# Patient Record
Sex: MALE | Race: WHITE | NOT HISPANIC OR LATINO | Employment: FULL TIME | URBAN - NONMETROPOLITAN AREA
[De-identification: names, ages, dates, MRNs, and addresses within clinical notes are randomized per-mention and may not be internally consistent; named-entity substitution may affect disease eponyms.]

---

## 2022-06-28 ENCOUNTER — PATIENT MESSAGE (OUTPATIENT)
Dept: FAMILY MEDICINE CLINIC | Age: 32
End: 2022-06-28

## 2022-06-28 ENCOUNTER — OFFICE VISIT (OUTPATIENT)
Dept: FAMILY MEDICINE CLINIC | Age: 32
End: 2022-06-28

## 2022-06-28 ENCOUNTER — LAB (OUTPATIENT)
Dept: LAB | Facility: HOSPITAL | Age: 32
End: 2022-06-28

## 2022-06-28 ENCOUNTER — HOSPITAL ENCOUNTER (OUTPATIENT)
Dept: GENERAL RADIOLOGY | Facility: HOSPITAL | Age: 32
Discharge: HOME | End: 2022-06-28

## 2022-06-28 VITALS
DIASTOLIC BLOOD PRESSURE: 85 MMHG | WEIGHT: 234.6 LBS | BODY MASS INDEX: 34.75 KG/M2 | SYSTOLIC BLOOD PRESSURE: 120 MMHG | TEMPERATURE: 98.6 F | HEART RATE: 90 BPM | HEIGHT: 69 IN

## 2022-06-28 DIAGNOSIS — G89.29 CHRONIC LEFT SHOULDER PAIN: ICD-10-CM

## 2022-06-28 DIAGNOSIS — Z00.00 ANNUAL PHYSICAL EXAM: ICD-10-CM

## 2022-06-28 DIAGNOSIS — M25.512 CHRONIC LEFT SHOULDER PAIN: ICD-10-CM

## 2022-06-28 DIAGNOSIS — Z23 NEED FOR TETANUS BOOSTER: ICD-10-CM

## 2022-06-28 DIAGNOSIS — R00.2 PALPITATIONS: ICD-10-CM

## 2022-06-28 DIAGNOSIS — Z00.00 ROUTINE ADULT HEALTH MAINTENANCE: ICD-10-CM

## 2022-06-28 DIAGNOSIS — Z13.29 SCREENING FOR THYROID DISORDER: ICD-10-CM

## 2022-06-28 DIAGNOSIS — R73.01 ELEVATED FASTING BLOOD SUGAR: ICD-10-CM

## 2022-06-28 DIAGNOSIS — Z13.220 SCREENING FOR LIPID DISORDERS: ICD-10-CM

## 2022-06-28 DIAGNOSIS — E66.9 OBESITY, CLASS I, BMI 30-34.9: ICD-10-CM

## 2022-06-28 DIAGNOSIS — Z71.6 TOBACCO ABUSE COUNSELING: ICD-10-CM

## 2022-06-28 DIAGNOSIS — Z13.31 NEGATIVE DEPRESSION SCREENING: Primary | ICD-10-CM

## 2022-06-28 DIAGNOSIS — R94.31 ABNORMAL EKG: ICD-10-CM

## 2022-06-28 PROBLEM — E66.811 OBESITY, CLASS I, BMI 30-34.9: Status: ACTIVE | Noted: 2022-06-28

## 2022-06-28 LAB
ALBUMIN SERPL-MCNC: 4.5 G/DL (ref 3.5–5.2)
ALBUMIN/GLOB SERPL: 1.3 G/DL
ALP SERPL-CCNC: 65 U/L (ref 39–117)
ALT SERPL W P-5'-P-CCNC: 39 U/L (ref 1–41)
ANION GAP SERPL CALCULATED.3IONS-SCNC: 8.8 MMOL/L (ref 5–15)
AST SERPL-CCNC: 25 U/L (ref 1–40)
BASOPHILS # BLD AUTO: 0.04 10*3/MM3 (ref 0–0.2)
BASOPHILS NFR BLD AUTO: 0.5 % (ref 0–1.5)
BILIRUB SERPL-MCNC: 0.6 MG/DL (ref 0–1.2)
BUN SERPL-MCNC: 11 MG/DL (ref 6–20)
BUN/CREAT SERPL: 9.8 (ref 7–25)
CALCIUM SPEC-SCNC: 9.6 MG/DL (ref 8.6–10.5)
CHLORIDE SERPL-SCNC: 102 MMOL/L (ref 98–107)
CHOLEST SERPL-MCNC: 167 MG/DL (ref 0–200)
CO2 SERPL-SCNC: 26.2 MMOL/L (ref 22–29)
CREAT SERPL-MCNC: 1.12 MG/DL (ref 0.76–1.27)
DEPRECATED RDW RBC AUTO: 41.5 FL (ref 37–54)
EGFRCR SERPLBLD CKD-EPI 2021: 90.6 ML/MIN/1.73
EOSINOPHIL # BLD AUTO: 0.12 10*3/MM3 (ref 0–0.4)
EOSINOPHIL NFR BLD AUTO: 1.6 % (ref 0.3–6.2)
ERYTHROCYTE [DISTWIDTH] IN BLOOD BY AUTOMATED COUNT: 12.9 % (ref 12.3–15.4)
GLOBULIN UR ELPH-MCNC: 3.4 GM/DL
GLUCOSE SERPL-MCNC: 108 MG/DL (ref 65–99)
HCT VFR BLD AUTO: 46.3 % (ref 37.5–51)
HDLC SERPL-MCNC: 34 MG/DL (ref 40–60)
HGB BLD-MCNC: 15.3 G/DL (ref 13–17.7)
IMM GRANULOCYTES # BLD AUTO: 0.01 10*3/MM3 (ref 0–0.05)
IMM GRANULOCYTES NFR BLD AUTO: 0.1 % (ref 0–0.5)
LDLC SERPL CALC-MCNC: 110 MG/DL (ref 0–100)
LDLC/HDLC SERPL: 3.16 {RATIO}
LYMPHOCYTES # BLD AUTO: 1.93 10*3/MM3 (ref 0.7–3.1)
LYMPHOCYTES NFR BLD AUTO: 25.5 % (ref 19.6–45.3)
MAGNESIUM SERPL-MCNC: 2.1 MG/DL (ref 1.6–2.6)
MCH RBC QN AUTO: 28.8 PG (ref 26.6–33)
MCHC RBC AUTO-ENTMCNC: 33 G/DL (ref 31.5–35.7)
MCV RBC AUTO: 87.2 FL (ref 79–97)
MONOCYTES # BLD AUTO: 0.72 10*3/MM3 (ref 0.1–0.9)
MONOCYTES NFR BLD AUTO: 9.5 % (ref 5–12)
NEUTROPHILS NFR BLD AUTO: 4.76 10*3/MM3 (ref 1.7–7)
NEUTROPHILS NFR BLD AUTO: 62.8 % (ref 42.7–76)
PLATELET # BLD AUTO: 187 10*3/MM3 (ref 140–450)
PMV BLD AUTO: 12.6 FL (ref 6–12)
POTASSIUM SERPL-SCNC: 4.2 MMOL/L (ref 3.5–5.2)
PROT SERPL-MCNC: 7.9 G/DL (ref 6–8.5)
RBC # BLD AUTO: 5.31 10*6/MM3 (ref 4.14–5.8)
SODIUM SERPL-SCNC: 137 MMOL/L (ref 136–145)
TRIGL SERPL-MCNC: 128 MG/DL (ref 0–150)
TSH SERPL DL<=0.05 MIU/L-ACNC: 1.07 UIU/ML (ref 0.27–4.2)
VLDLC SERPL-MCNC: 23 MG/DL (ref 5–40)
WBC NRBC COR # BLD: 7.58 10*3/MM3 (ref 3.4–10.8)

## 2022-06-28 PROCEDURE — 90715 TDAP VACCINE 7 YRS/> IM: CPT | Performed by: NURSE PRACTITIONER

## 2022-06-28 PROCEDURE — 99214 OFFICE O/P EST MOD 30 MIN: CPT | Performed by: NURSE PRACTITIONER

## 2022-06-28 PROCEDURE — 83036 HEMOGLOBIN GLYCOSYLATED A1C: CPT

## 2022-06-28 PROCEDURE — 36415 COLL VENOUS BLD VENIPUNCTURE: CPT

## 2022-06-28 PROCEDURE — 93000 ELECTROCARDIOGRAM COMPLETE: CPT | Performed by: FAMILY MEDICINE

## 2022-06-28 PROCEDURE — 73030 X-RAY EXAM OF SHOULDER: CPT

## 2022-06-28 PROCEDURE — 99385 PREV VISIT NEW AGE 18-39: CPT | Performed by: NURSE PRACTITIONER

## 2022-06-28 PROCEDURE — 90471 IMMUNIZATION ADMIN: CPT | Performed by: NURSE PRACTITIONER

## 2022-06-28 PROCEDURE — 80050 GENERAL HEALTH PANEL: CPT

## 2022-06-28 PROCEDURE — 83735 ASSAY OF MAGNESIUM: CPT

## 2022-06-28 PROCEDURE — 80061 LIPID PANEL: CPT

## 2022-06-28 NOTE — PROGRESS NOTES
Thierry French presents to [facility] with complaint of  Establish Care and Annual Exam    SUBJECTIVE  History of Present Illness     He is here to establish relations as a new patient. He is a [employer] . He is  and has one daughter.    He is having heart palpitations he is concerned about. He says this has been going off and on for the past 3 years. He says when he takes a deep breath the palpitations go away. He says the longest duration he has had palpitations for is 2-3 minutes. He denies CP or SOA.     He is also having left shoulder pain. This pain has occurred since he was about 19. He feels like his shoulder comes out of place at times as well. Certain movements make the pain worse. He has numbness, aching, stabbing. Sharp pains at time. Left arm goes numb when he is working and has his arms above his head for long period of time. Pain does not radiate. He currently has his shoulder in kinetic tape.     He is also wanting to be checked for a hernia of right groin. He says he has twinges of pain when he lifts something that occurs occasionally.     The following portions of the patient's history were reviewed and updated as appropriate: allergies, current medications, past family history, past medical history, past social history, past surgical history and problem list.    ECG 12 Lead    Date/Time: 6/28/2022 5:40 PM  Performed by: Nasreen Pina MD  Authorized by: Camila Julian APRN   Comparison: not compared with previous ECG   Rhythm: sinus rhythm  Rate: normal  Conduction: conduction normal  ST Elevation: aVF, V2 and V3  QRS axis: normal  Other findings: non-specific ST-T wave changes    Clinical impression: abnormal EKG  Comments: Unusual ST elevation in a 31-year-old so I would proceed with further work-up with cardiology.  I also agree with Holter monitor testing for palpitations.  Dr. Pina          OBJECTIVE  Vital Signs:   /85 (BP  "Location: Left arm, Patient Position: Sitting)   Pulse 90   Temp 98.6 °F (37 °C) (Oral)   Ht 175.3 cm (69\")   Wt 106 kg (234 lb 9.6 oz)   BMI 34.64 kg/m²       Physical Exam  Constitutional:       General: He is not in acute distress.     Appearance: Normal appearance. He is obese. He is not ill-appearing.   HENT:      Head: Normocephalic and atraumatic.      Nose: Nose normal.      Mouth/Throat:      Mouth: Mucous membranes are moist.   Cardiovascular:      Rate and Rhythm: Normal rate and regular rhythm.      Pulses: Normal pulses.      Heart sounds: Normal heart sounds.   Pulmonary:      Effort: Pulmonary effort is normal. No respiratory distress.      Breath sounds: Normal breath sounds.   Chest:      Chest wall: No tenderness.   Abdominal:      General: Bowel sounds are normal.      Palpations: Abdomen is soft.      Tenderness: There is no abdominal tenderness. There is no guarding.      Hernia: No hernia is present.   Musculoskeletal:         General: Normal range of motion.      Right shoulder: Normal.      Left shoulder: Tenderness present. No swelling, effusion or crepitus. Normal strength. Normal pulse.      Cervical back: Normal range of motion and neck supple.   Skin:     General: Skin is warm and dry.      Capillary Refill: Capillary refill takes less than 2 seconds.   Neurological:      General: No focal deficit present.      Mental Status: He is alert and oriented to person, place, and time. Mental status is at baseline.   Psychiatric:         Mood and Affect: Mood normal.         Behavior: Behavior normal.            ASSESSMENT AND PLAN:  Diagnoses and all orders for this visit:    1. Negative depression screening (Primary)    2. Need for tetanus booster  -     Tdap Vaccine Greater Than or Equal To 8yo IM    3. Routine adult health maintenance  -     CBC & Differential; Future  -     Comprehensive Metabolic Panel; Future    4. Annual physical exam    5. Screening for lipid disorders  -     Lipid " Panel; Future    6. Screening for thyroid disorder  -     TSH; Future    7. Palpitations  Assessment & Plan:  -EKG in office today abnormal   -check labs  -48 hour holter mon  -cardiology referral     Orders:  -     Magnesium; Future  -     Holter monitor - 48 hour; Future  -     ECG 12 Lead  -     Ambulatory Referral to Cardiology    8. Chronic left shoulder pain  Assessment & Plan:  -obtain xray, will likely need MRI   -conservative management for now     Orders:  -     XR Shoulder 2+ View Left; Future  -     Ambulatory Referral to Orthopedic Surgery    9. Obesity, Class I, BMI 30-34.9    10. Tobacco abuse counseling  Comments:  -he has been cigarette free for 4 days, encouraged him for success in this     11. Abnormal EKG  -     Ambulatory Referral to Cardiology      19 to 39: Counseling/Anticipatory Guidance Discussed: nutrition, physical activity, healthy weight, dental health, mental health and immunizations    Follow Up   Return depending on labs and holter results. Patient to notify office with any acute concerns or issues.  Patient verbalizes understanding, agrees with plan of care and has no further questions upon discharge.     Patient was given instructions and counseling regarding his condition or for health maintenance advice. Please see specific information pulled into the AVS if appropriate.

## 2022-06-30 ENCOUNTER — TELEPHONE (OUTPATIENT)
Dept: FAMILY MEDICINE CLINIC | Age: 32
End: 2022-06-30

## 2022-06-30 LAB — HBA1C MFR BLD: 5.4 % (ref 4.8–5.6)

## 2022-06-30 NOTE — TELEPHONE ENCOUNTER
Caller: Thierry French    Relationship: Self    Best call back number: [phone]    Caller requesting test results: YES     What test was performed: LABS, EKG, X-RAY     When was the test performed:06/28/2022    Where was the test performed: [facility]    Additional notes: PATIENT VIEWED HIS RESULTS VIA [employer] AND WANTS TO DISCUSS THEM WITH PCP. THANKS.

## 2022-08-09 ENCOUNTER — OFFICE VISIT (OUTPATIENT)
Dept: ORTHOPEDIC SURGERY | Facility: CLINIC | Age: 32
End: 2022-08-09

## 2022-08-09 VITALS — TEMPERATURE: 97.8 F | WEIGHT: 237.2 LBS | BODY MASS INDEX: 35.13 KG/M2 | HEIGHT: 69 IN

## 2022-08-09 DIAGNOSIS — M25.512 CHRONIC LEFT SHOULDER PAIN: Primary | ICD-10-CM

## 2022-08-09 DIAGNOSIS — G89.29 CHRONIC LEFT SHOULDER PAIN: Primary | ICD-10-CM

## 2022-08-09 PROCEDURE — 99214 OFFICE O/P EST MOD 30 MIN: CPT | Performed by: PHYSICIAN ASSISTANT

## 2022-08-09 NOTE — PROGRESS NOTES
"Chief Complaint  Establish Care and Pain of the Left Shoulder    Subjective    History of Present Illness      Thierry French is a 31 y.o. male who presents to [facility] for complaint of Shoulder Pain: Patient complains of left shoulder pain. He has experienced chronic pain in this shoulder, for more than 10 years. He reports constant pain/uncomfrotable in the shoulder. He did have injuries when younger, including a fracture of the clavicle from a football injury. He played football and baseball and was not able to throw with the left arm. He is now an  and states that with certain movements the pain is triggered and become more intense than usual, which will last for several days. His wife is an OT and does tape him with KT and has him use ice. He describes his pain as a throbbing/stabbing pain.           Objective   Vital Signs:   Temp 97.8 °F (36.6 °C)   Ht 175.3 cm (69\")   Wt 108 kg (237 lb 3.2 oz)   BMI 35.03 kg/m²     Physical Exam  Vitals signs and nursing note reviewed.   Constitutional:       Appearance: Normal appearance.   Pulmonary:      Effort: Pulmonary effort is normal.   Skin:     General: Skin is warm and dry.      Capillary Refill: Capillary refill takes less than 2 seconds.   Neurological:      General: No focal deficit present.      Mental Status: He is alert and oriented to person, place, and time. Mental status is at baseline.   Psychiatric:         Mood and Affect: Mood normal.         Behavior: Behavior normal.         Thought Content: Thought content normal.         Judgment: Judgment normal.     Ortho Exam   LEFT shoulder  Positive for tenderness over the anterior aspect of the articulation of glenohumeral joint.  Axillary nerve function is well preserved. Radial artery pulses are palpable.   Neer sign is positive. Ceron sign is positive.   Skin and soft tissues are normal.  AC joint is mildly tender.   The pain can be reproduced with external " rotation, abduction and axial loading of the joint.   There is no evidence of multidirectional instability.   The pain level is 4.       Result Review :   Radiologic studies - see below for interpretation  LEFT shoulder xrays   4 views were performed at [facility] on 6/28/22. Images were independently viewed and interpreted by myself, my impression as follows:  · Deformity noted of the distal clavicle from prior fracture.  AC joint space is well-preserved.  The bony glenoid shows subchondral cystic change, glenohumeral joint appears fairly preserved.        PROCEDURE  Procedures           Assessment   Assessment and Plan    Problem List Items Addressed This Visit        Musculoskeletal and Injuries    Chronic left shoulder pain - Primary    Relevant Orders    FL Contrast Injection CT / MRI    MRI shoulder left arthrogram          Follow Up   · Discussion of any imaging in detail. Discussion of orthopaedic goals.  · Risk, benefits, and merits of treatment alternatives reviewed with the patient. Treatment alternatives include: further imaging/testing to r/o labral injury.  · Ice, heat, and/or modalities as beneficial  · To schedule MR Arthrogram for left shoulder at [place]  · Patient is encouraged to call or return for any issues or concerns.  · Follow up will be based on when MRI has been performed  • Patient was given instructions and counseling regarding his condition or for health maintenance advice. Please see specific information pulled into the AVS if appropriate.     Saqib Deleon PA-C   Date of Encounter: 8/9/2022   Electronically signed by Saqib Deleon PA-C, 08/09/22, 3:35 PM EDT.   Electronically signed by Saqib Deleon PA-C, 08/27/22, 4:57 PM EDT.     EMR Dragon/Transcription disclaimer:  Much of this encounter note is an electronic transcription/translation of spoken language to printed text. The electronic translation of spoken language may permit erroneous,  or at times, nonsensical words or phrases to be inadvertently transcribed; Although I have reviewed the note for such errors, some may still exist.

## 2022-08-23 ENCOUNTER — OFFICE VISIT (OUTPATIENT)
Dept: CARDIOLOGY | Facility: CLINIC | Age: 32
End: 2022-08-23

## 2022-08-23 VITALS
HEIGHT: 69 IN | DIASTOLIC BLOOD PRESSURE: 79 MMHG | SYSTOLIC BLOOD PRESSURE: 140 MMHG | BODY MASS INDEX: 34.96 KG/M2 | WEIGHT: 236 LBS | HEART RATE: 73 BPM

## 2022-08-23 DIAGNOSIS — R00.2 PALPITATIONS: Primary | ICD-10-CM

## 2022-08-23 PROCEDURE — 99203 OFFICE O/P NEW LOW 30 MIN: CPT | Performed by: INTERNAL MEDICINE

## 2022-08-23 RX ORDER — CLINDAMYCIN PHOSPHATE 10 UG/ML
LOTION TOPICAL
COMMUNITY
Start: 2022-08-09

## 2022-08-23 NOTE — PROGRESS NOTES
Chief Complaint  Palpitations    Subjective            Thierry French presents to [facility]  History of present illness:    Patient is a 31-year-old white male with no significant past medical history who presents with palpitations.  He states that the palpitations are gone on since he was 20 or 21 years old.  He states he can have them 3 days in a row and then none for 3 months.  He feels what sounds like a skipped beat but then his heart will race.  He will hold his breath and kind to bear down and can block it and stop it at that time.  He notes no lightheadedness with it.  He is active with no chest pain.  He denies any edema.  His mother and father do not have coronary artery disease.  He currently has not smoked for 1 month and is continuing to try smoking cessation.  He notes occasional beer.      Past Medical History:   Diagnosis Date   • Asthma    • History of asthma            Past Surgical History:   Procedure Laterality Date   • PILONIDAL CYSTECTOMY     • SKIN BIOPSY  2022   • WISDOM TOOTH EXTRACTION            Social History     Socioeconomic History   • Marital status:    Tobacco Use   • Smoking status: Former Smoker     Packs/day: 0.25     Years: 5.00     Pack years: 1.25     Types: Cigarettes     Quit date: 2022     Years since quittin.1   • Smokeless tobacco: Former User     Types: Chew   Vaping Use   • Vaping Use: Never used   Substance and Sexual Activity   • Alcohol use: Not Currently     Comment: drinks 3-4 beers per week   • Drug use: Never   • Sexual activity: Defer           Family History   Problem Relation Age of Onset   • Throat cancer Mother    • Breast cancer Maternal Aunt    • Heart disease Paternal Aunt    • Stroke Maternal Grandfather    • Heart disease Maternal Grandfather    • Heart disease Paternal Grandfather             No Known Allergies         Current Outpatient Medications:   •  clindamycin (CLEOCIN T) 1 % lotion, APPLY  "TOPICALLY TO LEGS TWICE DAILY, Disp: , Rfl:       ROS:  Cardiac review of systems positive for palpitations    Objective     /79   Pulse 73   Ht 175.3 cm (69\")   Wt 107 kg (236 lb)   BMI 34.85 kg/m²       General Appearance:   · well developed  · well nourished  HENT:   · oropharynx moist  · lips not cyanotic  Respiratory:  · no respiratory distress  · normal breath sounds  · no rales  Cardiovascular:  · no jugular venous distention  · regular rhythm  · S1 normal, S2 normal  · no S3, no S4   · no murmur  · no rub, no thrill  · No carotid bruit  · pedal pulses normal  · lower extremity edema: none    Musculoskeletal:  · no clubbing of fingers.   · normocephalic, head atraumatic  Skin:   · warm, dry  Psychiatric:  · judgement and insight appropriate  · normal mood and affect          Procedures                      ASSESSMENT:  Encounter Diagnosis   Name Primary?   • Palpitations Yes         PLAN:    1.  Patient had an EKG done 6/28/2022 that showed normal sinus rhythm.  I do not see any significant abnormalities.  2.  Patient had a Holter monitor on for a day and a half but had no palpitations at that time.  They did note rare PACs.  3.  Patient's blood pressure today is borderline but he does state he is anxious coming in to see a heart doctor.  The last time it was under excellent control.  4.  Patient had blood work done in June that showed an LDL of 110, HDL 34, and triglycerides 128.  These are under good control.  He also had normal hemoglobin, magnesium, potassium and thyroid.  5.  These do sound possibly consistent with an SVT.  He is almost taught himself the Valsalva maneuver and is able to break it with these.  Unfortunately we would have to have a monitor on at the time to make sure that this is an SVT and just not feeling anxious after having a PAC.  He is in agreement to try the monitor for 1 month.  Hopefully we can catch 1 or 2 of these episodes.  6.  Encouraged continued smoking cessation " and staying active.        Patient was given instructions and counseling regarding his condition or for health maintenance advice. Please see specific information pulled into the AVS if appropriate.         Molina Acosta MD   8/23/2022  10:50 EDT

## 2022-09-27 ENCOUNTER — HOSPITAL ENCOUNTER (OUTPATIENT)
Dept: GENERAL RADIOLOGY | Facility: HOSPITAL | Age: 32
End: 2022-09-27

## 2022-09-27 ENCOUNTER — TELEPHONE (OUTPATIENT)
Dept: CARDIOLOGY | Facility: CLINIC | Age: 32
End: 2022-09-27

## 2022-09-27 ENCOUNTER — APPOINTMENT (OUTPATIENT)
Dept: MRI IMAGING | Facility: HOSPITAL | Age: 32
End: 2022-09-27

## 2022-09-27 NOTE — TELEPHONE ENCOUNTER
----- Message from MARCUS Gary sent at 9/27/2022  1:16 PM EDT -----  Notify patient the results of his cardiac event monitor were unremarkable.  His average heart rate is 100 bpm and there were no significant abnormal rhythms noted.

## 2022-10-04 ENCOUNTER — OFFICE VISIT (OUTPATIENT)
Dept: CARDIOLOGY | Facility: CLINIC | Age: 32
End: 2022-10-04

## 2022-10-04 VITALS
HEIGHT: 69 IN | WEIGHT: 246.2 LBS | HEART RATE: 71 BPM | SYSTOLIC BLOOD PRESSURE: 139 MMHG | BODY MASS INDEX: 36.46 KG/M2 | DIASTOLIC BLOOD PRESSURE: 78 MMHG

## 2022-10-04 DIAGNOSIS — Z72.0 TOBACCO ABUSE: ICD-10-CM

## 2022-10-04 DIAGNOSIS — R00.2 PALPITATIONS: Primary | ICD-10-CM

## 2022-10-04 PROCEDURE — 99213 OFFICE O/P EST LOW 20 MIN: CPT | Performed by: INTERNAL MEDICINE

## 2022-10-04 NOTE — PROGRESS NOTES
"Chief Complaint  Palpitations    Subjective        Thierry French presents to [facility]  History of present illness:    Patient's had 1 episode of palpitations since seeing us but it was after he had turned in the event recorder.  He was working on a car at the time and felt the extra beat and then his heart racing for about 10 seconds.  He was again able to Valsalva and stop it.  He is active although he does not have a regular exercise program.  He continues to cut back on smoking with goal of quitting.      Past Medical History:   Diagnosis Date   • Asthma    • History of asthma          Past Surgical History:   Procedure Laterality Date   • PILONIDAL CYSTECTOMY     • SKIN BIOPSY  2022   • WISDOM TOOTH EXTRACTION            Social History     Socioeconomic History   • Marital status:    Tobacco Use   • Smoking status: Former Smoker     Packs/day: 0.25     Years: 5.00     Pack years: 1.25     Types: Cigarettes     Quit date: 2022     Years since quittin.2   • Smokeless tobacco: Former User     Types: Chew   Vaping Use   • Vaping Use: Never used   Substance and Sexual Activity   • Alcohol use: Not Currently     Comment: drinks 3-4 beers per week   • Drug use: Never   • Sexual activity: Defer         Family History   Problem Relation Age of Onset   • Throat cancer Mother    • Breast cancer Maternal Aunt    • Heart disease Paternal Aunt    • Stroke Maternal Grandfather    • Heart disease Maternal Grandfather    • Heart disease Paternal Grandfather           No Known Allergies         Current Outpatient Medications:   •  clindamycin (CLEOCIN T) 1 % lotion, APPLY TOPICALLY TO LEGS TWICE DAILY, Disp: , Rfl:       ROS:  Cardiac review of systems positive for rare palpitation.    Objective     /78   Pulse 71   Ht 175.3 cm (69\")   Wt 112 kg (246 lb 3.2 oz)   BMI 36.36 kg/m²       General Appearance:   · well developed  · well nourished  HENT:   · oropharynx " moist  · lips not cyanotic  Respiratory:  · no respiratory distress  · normal breath sounds  · no rales  Cardiovascular:  · no jugular venous distention  · regular rhythm  · S1 normal, S2 normal  · no S3, no S4   · no murmur  · no rub, no thrill  · No carotid bruit  · pedal pulses normal  · lower extremity edema: none    Musculoskeletal:  · no clubbing of fingers.   · normocephalic, head atraumatic  Skin:   · warm, dry  Psychiatric:  · judgement and insight appropriate  · normal mood and affect    ECHO:    STRESS:    CATH:  No results found for this or any previous visit.    BMP:   Glucose   Date Value Ref Range Status   06/28/2022 108 (H) 65 - 99 mg/dL Final     BUN   Date Value Ref Range Status   06/28/2022 11 6 - 20 mg/dL Final     Creatinine   Date Value Ref Range Status   06/28/2022 1.12 0.76 - 1.27 mg/dL Final     Sodium   Date Value Ref Range Status   06/28/2022 137 136 - 145 mmol/L Final     Potassium   Date Value Ref Range Status   06/28/2022 4.2 3.5 - 5.2 mmol/L Final     Chloride   Date Value Ref Range Status   06/28/2022 102 98 - 107 mmol/L Final     CO2   Date Value Ref Range Status   06/28/2022 26.2 22.0 - 29.0 mmol/L Final     Calcium   Date Value Ref Range Status   06/28/2022 9.6 8.6 - 10.5 mg/dL Final     BUN/Creatinine Ratio   Date Value Ref Range Status   06/28/2022 9.8 7.0 - 25.0 Final     Anion Gap   Date Value Ref Range Status   06/28/2022 8.8 5.0 - 15.0 mmol/L Final     eGFR   Date Value Ref Range Status   06/28/2022 90.6 >60.0 mL/min/1.73 Final     Comment:     National Kidney Foundation and American Society of Nephrology (ASN) Task Force recommended calculation based on the Chronic Kidney Disease Epidemiology Collaboration (CKD-EPI) equation refit without adjustment for race.     LIPIDS:  Total Cholesterol   Date Value Ref Range Status   06/28/2022 167 0 - 200 mg/dL Final     Triglycerides   Date Value Ref Range Status   06/28/2022 128 0 - 150 mg/dL Final     HDL Cholesterol   Date Value Ref  Range Status   06/28/2022 34 (L) 40 - 60 mg/dL Final     LDL Cholesterol    Date Value Ref Range Status   06/28/2022 110 (H) 0 - 100 mg/dL Final     VLDL Cholesterol   Date Value Ref Range Status   06/28/2022 23 5 - 40 mg/dL Final     LDL/HDL Ratio   Date Value Ref Range Status   06/28/2022 3.16  Final         Procedures             ASSESSMENT:  Encounter Diagnoses   Name Primary?   • Palpitations Yes   • Tobacco abuse          PLAN:    1.  Patient's palpitations certainly sound with an AV cassandra dependent SVT as he is able to break them each time with the Valsalva maneuver.  Unfortunately they come very sporadically and we were not able to catch him with a monitor.  I told him if they became more frequent or lasting longer to give us a call and we could try another monitor.  I did describe to him the benign nature of these.  2.  Encourage continued cutting down on smoking with goal of quitting.  3.  Blood pressure is borderline.  We talked about watching salt along with weight loss and a regular exercise program.  4.  Patient had cholesterol checked 6/28/2022 with , HDL 34, and triglycerides 128.  These are under good control.          Patient was given instructions and counseling regarding his condition or for health maintenance advice. Please see specific information pulled into the AVS if appropriate.         Molina Acosta MD   10/4/2022  11:57 EDT

## 2022-10-05 ENCOUNTER — HOSPITAL ENCOUNTER (OUTPATIENT)
Dept: GENERAL RADIOLOGY | Facility: HOSPITAL | Age: 32
Discharge: HOME | End: 2022-10-05

## 2022-10-05 ENCOUNTER — HOSPITAL ENCOUNTER (OUTPATIENT)
Dept: MRI IMAGING | Facility: HOSPITAL | Age: 32
Discharge: HOME | End: 2022-10-05

## 2022-10-05 DIAGNOSIS — G89.29 CHRONIC LEFT SHOULDER PAIN: ICD-10-CM

## 2022-10-05 DIAGNOSIS — M25.512 CHRONIC LEFT SHOULDER PAIN: ICD-10-CM

## 2022-10-05 PROCEDURE — 0 LIDOCAINE 1 % SOLUTION: Performed by: PHYSICIAN ASSISTANT

## 2022-10-05 PROCEDURE — 25010000002 IOPAMIDOL 61 % SOLUTION: Performed by: PHYSICIAN ASSISTANT

## 2022-10-05 PROCEDURE — 73222 MRI JOINT UPR EXTREM W/DYE: CPT

## 2022-10-05 PROCEDURE — A9577 INJ MULTIHANCE: HCPCS | Performed by: PHYSICIAN ASSISTANT

## 2022-10-05 PROCEDURE — 77002 NEEDLE LOCALIZATION BY XRAY: CPT

## 2022-10-05 PROCEDURE — 0 GADOBENATE DIMEGLUMINE 529 MG/ML SOLUTION: Performed by: PHYSICIAN ASSISTANT

## 2022-10-05 RX ORDER — LIDOCAINE HYDROCHLORIDE 10 MG/ML
10 INJECTION, SOLUTION INFILTRATION; PERINEURAL ONCE
Status: COMPLETED | OUTPATIENT
Start: 2022-10-05 | End: 2022-10-05

## 2022-10-05 RX ADMIN — GADOBENATE DIMEGLUMINE 0.1 ML: 529 INJECTION, SOLUTION INTRAVENOUS at 13:54

## 2022-10-05 RX ADMIN — LIDOCAINE HYDROCHLORIDE 1 ML: 10 INJECTION, SOLUTION INFILTRATION; PERINEURAL at 13:54

## 2022-10-05 RX ADMIN — IOPAMIDOL 2 ML: 612 INJECTION, SOLUTION INTRAVENOUS at 13:54

## 2022-10-07 ENCOUNTER — TELEPHONE (OUTPATIENT)
Dept: FAMILY MEDICINE CLINIC | Age: 32
End: 2022-10-07

## 2022-10-07 NOTE — TELEPHONE ENCOUNTER
Caller: Thierry French    Relationship: Self    Best call back number: [phone]    Caller requesting test results: PATIENT    What test was performed:  MRI    When was the test performed: 10/5/22    Additional notes: PATIENT WOULD LIKE TO HAVE THE RESULTS OF HIS MRI EXPLAINED TO HIM. PLEASE CALL PATIENT TO ADVISE.

## 2022-10-07 NOTE — TELEPHONE ENCOUNTER
Spoke with patient and advised him that he would need to call the Ortho office to get the results of the MRI.

## 2022-11-28 ENCOUNTER — OFFICE VISIT (OUTPATIENT)
Dept: ORTHOPEDIC SURGERY | Facility: CLINIC | Age: 32
End: 2022-11-28

## 2022-11-28 VITALS — WEIGHT: 235 LBS | HEIGHT: 69 IN | TEMPERATURE: 97.8 F | BODY MASS INDEX: 34.8 KG/M2

## 2022-11-28 DIAGNOSIS — G89.29 CHRONIC LEFT SHOULDER PAIN: Primary | ICD-10-CM

## 2022-11-28 DIAGNOSIS — M25.512 CHRONIC LEFT SHOULDER PAIN: Primary | ICD-10-CM

## 2022-11-28 DIAGNOSIS — E66.9 OBESITY, CLASS I, BMI 30-34.9: ICD-10-CM

## 2022-11-28 PROCEDURE — 99213 OFFICE O/P EST LOW 20 MIN: CPT | Performed by: ORTHOPAEDIC SURGERY

## 2022-12-05 ENCOUNTER — TREATMENT (OUTPATIENT)
Dept: PHYSICAL THERAPY | Facility: CLINIC | Age: 32
End: 2022-12-05

## 2022-12-05 DIAGNOSIS — R29.898 LEFT ARM WEAKNESS: ICD-10-CM

## 2022-12-05 DIAGNOSIS — M25.512 CHRONIC LEFT SHOULDER PAIN: Primary | ICD-10-CM

## 2022-12-05 DIAGNOSIS — G89.29 CHRONIC LEFT SHOULDER PAIN: Primary | ICD-10-CM

## 2022-12-05 PROCEDURE — 97161 PT EVAL LOW COMPLEX 20 MIN: CPT | Performed by: PHYSICAL THERAPIST

## 2022-12-05 NOTE — PROGRESS NOTES
Physical Therapy Initial Evaluation and Plan of Care      Patient: Thierry French   : 1990  Diagnosis/ICD-10 Code:  Chronic left shoulder pain [M25.512, G89.29]  Referring practitioner: Emmanuel Zambrano MD  Date of Initial Visit: 2022  Today's Date: 2022  Patient seen for 1 sessions         Visit Diagnoses:    ICD-10-CM ICD-9-CM   1. Chronic left shoulder pain  M25.512 719.41    G89.29 338.29   2. Left arm weakness  R29.898 729.89       Subjective Evaluation    History of Present Illness  Mechanism of injury: Patient states that he thinks his L shoulder is related to high school football, as well as his work duties currently.  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  The shoulder has been hurting 10+ years.  He saw Dr. Zambrano.  There is no cartilage left in the socket, the rotator cuff is intact, and the labrum is healing currently.  At this time, no surgery is needed.  He was referred to [facility].    If his L arm is overhead for too long, the L arm will go numb.  Driving will cause tingling on the L hand after a period of time.  He gets the radicular symptoms in the 2/3/4 fingers on the L hand.      His shoulder has dislocated in/out of place, but it hasn't done it in a while.     L SHOULDER MRI IMPRESSION:  1. Glenohumeral joint osteoarthritis with full-thickness articular  cartilage loss of the posterior glenoid. Degenerative tear of the  posterior-superior and posterior-inferior labrum with degenerative  paralabral cyst adjacent to the posterior-superior labrum that exhibits  partial intraosseous extension. Small tear along the articular margin of  the anterior-inferior labrum.  2. Humeral head exhibits subluxation posteriorly with respect to the  glenoid associated with ligamentous laxity. Small foci of chondral  debris subscapularis recess.    Pain  Current pain rating: 3  At worst pain ratin  Quality: throbbing and dull ache (shooting)  Relieving factors: ice (kinesiotape)  Exacerbated by:  opening a bag of chip motions, pulling apart, cable, overhead activity.  Progression: improved (more stable)    Social Support  Lives with: spouse and young children    Hand dominance: right    Patient Goals  Patient goals for therapy: decreased pain, increased motion, increased strength, independence with ADLs/IADLs and return to sport/leisure activities             Objective          Palpation   Left   Tenderness of the infraspinatus, levator scapulae, lower trapezius, middle trapezius, pectoralis minor and upper trapezius.     Active Range of Motion   Left Shoulder   Flexion: 180 degrees with pain  Extension: 40 degrees   Abduction: 155 degrees with pain  External rotation 0°: 18 degrees with pain  Internal rotation BTB: L5 with pain    Strength/Myotome Testing     Left Shoulder     Planes of Motion   Flexion: 3+   Extension: 4-   Abduction: 3+   External rotation at 0°: 3+   Internal rotation at 0°: 3+     Right Shoulder   Normal muscle strength    Tests     Left Shoulder   Positive anterior load and shift, crossover, drop arm, empty can, Hawkin's and lift-off.           Assessment & Plan     Assessment  Impairments: abnormal muscle firing, abnormal or restricted ROM, activity intolerance, impaired physical strength and pain with function  Functional Limitations: carrying objects, lifting, pulling, pushing, uncomfortable because of pain, reaching behind back, reaching overhead and unable to perform repetitive tasks  Assessment details: Pt presents with limitations, noted below, that impede his ability to lift heavy weights, pulling/pushing items, movements of opening/closing items, overhead activity, outstretched or reaching. The skills of a therapist will be required to safely and effectively implement the following treatment plan to restore maximal level of function.        Goals  Plan Goals: SHOULDER  PROBLEMS:     1. The patient has limited ROM of the left shoulder.    LTG 1: 12 weeks:  The patient will  demonstrate 180 degrees of shoulder abduction, 80 degrees of shoulder external rotation, and 80 degrees of shoulder internal rotation to allow the patient to reach into upper kitchen cabinets and manipulate clothing behind the back with greater ease.    STATUS:  New   STG 1a: 4 weeks:  The patient will demonstrate  165 degrees of shoulder abduction, 60 degrees of shoulder external rotation, and 60 degrees of shoulder internal rotation.    STATUS:  New   TREATMENT: Manual therapy, therapeutic exercise, home exercise instruction, and modalities as needed to include: electrical stimulation, ultrasound, moist heat, and ice.    2. The patient has limited strength of the left shoulder.   LTG 2: 12 weeks:  The patient will demonstrate 4+ /5 strength for left shoulder flexion, abduction, external rotation, and internal rotation in order to demonstrate improved shoulder stability.    STATUS:  New   STG 2a: 4 weeks:  The patient will demonstrate 4 /5 strength for left shoulder flexion, abduction, external rotation, and internal rotation.    STATUS:  New   STG2b:  4 weeks:  The patient will be independent with home exercises.     STATUS:  New   TREATMENT: Manual therapy, therapeutic exercise, home exercise instruction, and modalities as needed to include: electrical stimulation, ultrasound, moist heat, and ice.     3. The patient complains of pain to the left shoulder.   LTG 3: 12 weeks:  The patient will report a pain rating of 2 /10 or better in order to improve sleep quality and tolerance to performance of activities of daily living.    STATUS:  New   STG 3a: 4 weeks:  The patient will report a pain rating of 4 /10 or better.     STATUS:  New   TREATMENT: Manual therapy, therapeutic exercise, home exercise instruction, and modalities as needed to include: electrical stimulation, ultrasound, moist heat, and ice.    4. Carrying, Moving, and Handling Objects Functional Limitation     LTG 4: 12 weeks:  The patient will  demonstrate 12 % limitation by achieving a score of  on the QuickDASH.    STATUS:  New   STG 4a: 4 weeks:  The patient will demonstrate 18 % limitation by achieving a score of  on the QuickDASH.      STATUS:  New   TREATMENT:  Manual therapy, therapeutic exercise, home exercise instruction, and modalities as needed to include: moist heat, electrical stimulation, and ultrasound.      Plan  Therapy options: will be seen for skilled therapy services  Planned modality interventions: cryotherapy, dry needling, electrical stimulation/Russian stimulation, TENS, thermotherapy (hydrocollator packs) and ultrasound  Planned therapy interventions: manual therapy, flexibility, functional ROM exercises, home exercise program, therapeutic activities, stretching, strengthening, soft tissue mobilization, postural training, neuromuscular re-education, body mechanics training, ADL retraining, fine motor coordination training and joint mobilization  Frequency: 1x week  Duration in weeks: 12  Treatment plan discussed with: patient  Plan details: Create an HEP, update as needed.    Progress with L shoulder/scapular strength, trunk control/postural awareness, balance and gait training, coordination, increased stamina, decreased tightness, improved ROM/flexibility, education as needed.            History # of Personal Factors and/or Comorbidities: MODERATE (1-2)  Examination of Body System(s): # of elements: MODERATE (3)  Clinical Presentation: STABLE   Clinical Decision Making: LOW     Timed:  Manual Therapy:         mins  44801;  Therapeutic Exercise:    6     mins  23413;     Neuromuscular Ruben:        mins  15697;    Therapeutic Activity:          mins  11657;     Gait Training:           mins  97486;     Ultrasound:          mins  99307;    Canalith Repos           ___  mins  68055      Untimed:  Electrical Stimulation:         mins  83937 ( );  Mechanical Traction:         mins  63551;   Dry Needling:                     mins  self pay  Low Eval:                      39     mins  97840;  Medium Eval:                     mins  54507;   High Eval:                          mins  96242       Timed Treatment:   6   mins   Total Treatment:     45   mins    PT SIGNATURE: Alejandra Novoa PT, DPT  [place] License: [id number]  Electronically signed by Alejandra Novoa PT, 12/05/22, 12:51 PM EST      Initial Certification    Certification Period: 12/5/2022 thru 3/4/2023  I certify that the therapy services are furnished while this patient is under my care.  The services outlined above are required by this patient, and will be reviewed every 90 days.     PHYSICIAN: Emmanuel Zambrano MD  NPI: [id number]            PHYSICIAN PRINT NAME: ______________________________________________      PHYSICIAN SIGNATURE: ______________________________________________         DATE:________________________________        Please sign and return via fax to [phone].  Thank you, [facility].

## 2022-12-12 ENCOUNTER — TREATMENT (OUTPATIENT)
Dept: PHYSICAL THERAPY | Facility: CLINIC | Age: 32
End: 2022-12-12

## 2022-12-12 DIAGNOSIS — M25.512 CHRONIC LEFT SHOULDER PAIN: Primary | ICD-10-CM

## 2022-12-12 DIAGNOSIS — R29.898 LEFT ARM WEAKNESS: ICD-10-CM

## 2022-12-12 DIAGNOSIS — G89.29 CHRONIC LEFT SHOULDER PAIN: Primary | ICD-10-CM

## 2022-12-12 PROCEDURE — 97110 THERAPEUTIC EXERCISES: CPT | Performed by: PHYSICAL THERAPIST

## 2022-12-12 PROCEDURE — 97530 THERAPEUTIC ACTIVITIES: CPT | Performed by: PHYSICAL THERAPIST

## 2022-12-12 PROCEDURE — 97112 NEUROMUSCULAR REEDUCATION: CPT | Performed by: PHYSICAL THERAPIST

## 2022-12-12 NOTE — PROGRESS NOTES
"Physical Therapy Daily Treatment Note      Patient: Thierry French   : 1990  Referring practitioner: Emmanuel Zambrano MD  Date of Initial Visit: Type: THERAPY  Noted: 2022  Today's Date: 2022  Patient seen for 2 sessions           Visit Diagnoses:    ICD-10-CM ICD-9-CM   1. Chronic left shoulder pain  M25.512 719.41    G89.29 338.29   2. Left arm weakness  R29.898 729.89       Subjective Evaluation    History of Present Illness    Subjective comment: He note that he is used to some of the shoulder pain in the L arm, but the L side bothers him more.          Objective           General Comments     Shoulder Comments   Rounded shoulders B     See Exercise, Manual, and Modality Logs for complete treatment.       Assessment & Plan     Assessment  Impairments: abnormal muscle firing, abnormal or restricted ROM, activity intolerance, impaired physical strength and pain with function  Functional Limitations: carrying objects, lifting, pulling, pushing, uncomfortable because of pain, reaching behind back, reaching overhead and unable to perform repetitive tasks  Assessment details: B horizontal shoulder abduction increases pain and \"shoulder slipping\".  He feels like the shoulder will dislocate with that position.  Progressed with scapular and shoulder strengthening today, improvements with postural awareness and strengthening.  Updated HEP.          Goals  Plan Goals: SHOULDER  PROBLEMS:     1. The patient has limited ROM of the left shoulder.    LTG 1: 12 weeks:  The patient will demonstrate 180 degrees of shoulder abduction, 80 degrees of shoulder external rotation, and 80 degrees of shoulder internal rotation to allow the patient to reach into upper kitchen cabinets and manipulate clothing behind the back with greater ease.    STATUS:  Progressing   STG 1a: 4 weeks:  The patient will demonstrate  165 degrees of shoulder abduction, 60 degrees of shoulder external rotation, and 60 degrees of shoulder " internal rotation.    STATUS:  Progressing   TREATMENT: Manual therapy, therapeutic exercise, home exercise instruction, and modalities as needed to include: electrical stimulation, ultrasound, moist heat, and ice.    2. The patient has limited strength of the left shoulder.   LTG 2: 12 weeks:  The patient will demonstrate 4+ /5 strength for left shoulder flexion, abduction, external rotation, and internal rotation in order to demonstrate improved shoulder stability.    STATUS:  Progressing   STG 2a: 4 weeks:  The patient will demonstrate 4 /5 strength for left shoulder flexion, abduction, external rotation, and internal rotation.    STATUS:  Progressing   STG2b:  4 weeks:  The patient will be independent with home exercises.     STATUS:  Progressing   TREATMENT: Manual therapy, therapeutic exercise, home exercise instruction, and modalities as needed to include: electrical stimulation, ultrasound, moist heat, and ice.     3. The patient complains of pain to the left shoulder.   LTG 3: 12 weeks:  The patient will report a pain rating of 2 /10 or better in order to improve sleep quality and tolerance to performance of activities of daily living.    STATUS:  Progressing   STG 3a: 4 weeks:  The patient will report a pain rating of 4 /10 or better.     STATUS:  Progressing   TREATMENT: Manual therapy, therapeutic exercise, home exercise instruction, and modalities as needed to include: electrical stimulation, ultrasound, moist heat, and ice.    4. Carrying, Moving, and Handling Objects Functional Limitation     LTG 4: 12 weeks:  The patient will demonstrate 12 % limitation by achieving a score of  on the QuickDASH.    STATUS:  Progressing   STG 4a: 4 weeks:  The patient will demonstrate 18 % limitation by achieving a score of  on the QuickDASH.      STATUS:  Progressing   TREATMENT:  Manual therapy, therapeutic exercise, home exercise instruction, and modalities as needed to include: moist heat, electrical stimulation,  and ultrasound.      Plan  Therapy options: will be seen for skilled therapy services  Planned modality interventions: cryotherapy, dry needling, electrical stimulation/Russian stimulation, TENS, thermotherapy (hydrocollator packs) and ultrasound  Planned therapy interventions: manual therapy, flexibility, functional ROM exercises, home exercise program, therapeutic activities, stretching, strengthening, soft tissue mobilization, postural training, neuromuscular re-education, body mechanics training, ADL retraining, fine motor coordination training and joint mobilization  Frequency: 1x week  Duration in weeks: 12  Treatment plan discussed with: patient  Plan details: Progress with L shoulder/scapular strength, postural awareness, balance and gait training, coordination, increased stamina, decreased tightness.    May use KT for proper scapular/shoulder positioning and perform further scap stabilization following.             Progress per Plan of Care and Progress strengthening /stabilization /functional activity           Timed:  Manual Therapy:         mins  47420;  Therapeutic Exercise:    10     mins  44232;     Neuromuscular Ruben:    15    mins  97651;    Therapeutic Activity:     8     mins  85047;     Gait Training:           mins  70907;     Ultrasound:          mins  55012;    Canalith Repos           __  mins  41364      Untimed:  Electrical Stimulation:         mins  88047 ( );  Mechanical Traction:         mins  33810;   Dry Needling:                     mins self pay       Timed Treatment:   33   mins   Total Treatment:     33   mins      Alejandra Novoa PT, DPT  [place] License #: [id number]

## 2022-12-19 ENCOUNTER — TELEPHONE (OUTPATIENT)
Dept: PHYSICAL THERAPY | Facility: CLINIC | Age: 32
End: 2022-12-19

## 2022-12-19 NOTE — TELEPHONE ENCOUNTER
Caller: Thierry French    Relationship: Self       What was the call regarding:HAS A WORK MEETING

## 2022-12-29 ENCOUNTER — TREATMENT (OUTPATIENT)
Dept: PHYSICAL THERAPY | Facility: CLINIC | Age: 32
End: 2022-12-29

## 2022-12-29 DIAGNOSIS — G89.29 CHRONIC LEFT SHOULDER PAIN: Primary | ICD-10-CM

## 2022-12-29 DIAGNOSIS — R29.898 LEFT ARM WEAKNESS: ICD-10-CM

## 2022-12-29 DIAGNOSIS — M25.512 CHRONIC LEFT SHOULDER PAIN: Primary | ICD-10-CM

## 2022-12-29 PROCEDURE — 97112 NEUROMUSCULAR REEDUCATION: CPT | Performed by: PHYSICAL THERAPIST

## 2022-12-29 PROCEDURE — 97110 THERAPEUTIC EXERCISES: CPT | Performed by: PHYSICAL THERAPIST

## 2022-12-29 PROCEDURE — 97530 THERAPEUTIC ACTIVITIES: CPT | Performed by: PHYSICAL THERAPIST

## 2022-12-29 NOTE — PROGRESS NOTES
Physical Therapy Daily Treatment Note      Patient: Thierry French   : 1990  Referring practitioner: Emmanuel Zambrano MD  Date of Initial Visit: Type: THERAPY  Noted: 2022  Today's Date: 2022  Patient seen for 3 sessions           Subjective     Objective   See Exercise, Manual, and Modality Logs for complete treatment.       Assessment & Plan     Assessment  Impairments: abnormal muscle firing, abnormal or restricted ROM, activity intolerance, impaired physical strength and pain with function  Functional Limitations: carrying objects, lifting, pulling, pushing, uncomfortable because of pain, reaching behind back, reaching overhead and unable to perform repetitive tasks  Assessment details: Pt cont to show weakness and instability in the SHLD with exercises. The body blade is the hardest for the pt to perform correctly.  There is good form with Horizontal ABD this tx session which pt was not able ot do at last tx. Most of the exercises were performed without issue.    Goals  Plan Goals: SHOULDER  PROBLEMS:     1. The patient has limited ROM of the left shoulder.    LTG 1: 12 weeks:  The patient will demonstrate 180 degrees of shoulder abduction, 80 degrees of shoulder external rotation, and 80 degrees of shoulder internal rotation to allow the patient to reach into upper kitchen cabinets and manipulate clothing behind the back with greater ease.    STATUS:  Progressing   STG 1a: 4 weeks:  The patient will demonstrate  165 degrees of shoulder abduction, 60 degrees of shoulder external rotation, and 60 degrees of shoulder internal rotation.    STATUS:  Progressing   TREATMENT: Manual therapy, therapeutic exercise, home exercise instruction, and modalities as needed to include: electrical stimulation, ultrasound, moist heat, and ice.    2. The patient has limited strength of the left shoulder.   LTG 2: 12 weeks:  The patient will demonstrate 4+ /5 strength for left shoulder flexion, abduction, external  rotation, and internal rotation in order to demonstrate improved shoulder stability.    STATUS:  Progressing   STG 2a: 4 weeks:  The patient will demonstrate 4 /5 strength for left shoulder flexion, abduction, external rotation, and internal rotation.    STATUS:  Progressing   STG2b:  4 weeks:  The patient will be independent with home exercises.     STATUS:  Progressing   TREATMENT: Manual therapy, therapeutic exercise, home exercise instruction, and modalities as needed to include: electrical stimulation, ultrasound, moist heat, and ice.     3. The patient complains of pain to the left shoulder.   LTG 3: 12 weeks:  The patient will report a pain rating of 2 /10 or better in order to improve sleep quality and tolerance to performance of activities of daily living.    STATUS:  Progressing   STG 3a: 4 weeks:  The patient will report a pain rating of 4 /10 or better.     STATUS:  Progressing   TREATMENT: Manual therapy, therapeutic exercise, home exercise instruction, and modalities as needed to include: electrical stimulation, ultrasound, moist heat, and ice.    4. Carrying, Moving, and Handling Objects Functional Limitation     LTG 4: 12 weeks:  The patient will demonstrate 12 % limitation by achieving a score of  on the QuickDASH.    STATUS:  Progressing   STG 4a: 4 weeks:  The patient will demonstrate 18 % limitation by achieving a score of  on the QuickDASH.      STATUS:  Progressing   TREATMENT:  Manual therapy, therapeutic exercise, home exercise instruction, and modalities as needed to include: moist heat, electrical stimulation, and ultrasound.      Plan  Therapy options: will be seen for skilled therapy services  Planned modality interventions: cryotherapy, dry needling, electrical stimulation/Russian stimulation, TENS, thermotherapy (hydrocollator packs) and ultrasound  Planned therapy interventions: manual therapy, flexibility, functional ROM exercises, home exercise program, therapeutic activities,  stretching, strengthening, soft tissue mobilization, postural training, neuromuscular re-education, body mechanics training, ADL retraining, fine motor coordination training and joint mobilization  Frequency: 1x week  Duration in weeks: 12  Treatment plan discussed with: patient  Plan details: Cont to address SHLD and SHLD complex weakness and use techniques for greater stabilization.            Visit Diagnoses:    ICD-10-CM ICD-9-CM   1. Chronic left shoulder pain  M25.512 719.41    G89.29 338.29   2. Left arm weakness  R29.898 729.89       Progress per Plan of Care    Timed:    Therapeutic Exercise:    10     mins  41025;  Manual Therapy:         mins  92450;     Neuromuscular Ruben:   10    mins  12856;    Therapeutic Activity:     10     mins  79842;     Gait Training:           mins  48453;     Ultrasound:          mins  23746;      Untimed:  Electrical Stimulation:         mins  51521 ( );  Mechanical Traction:         mins  24246;     Timed Treatment:   30   mins   Total Treatment:     32   mins      Ethel Tolentino PTA  Physical Therapist Assistant

## 2023-01-09 ENCOUNTER — TREATMENT (OUTPATIENT)
Dept: PHYSICAL THERAPY | Facility: CLINIC | Age: 33
End: 2023-01-09
Payer: COMMERCIAL

## 2023-01-09 DIAGNOSIS — G89.29 CHRONIC LEFT SHOULDER PAIN: Primary | ICD-10-CM

## 2023-01-09 DIAGNOSIS — R29.898 LEFT ARM WEAKNESS: ICD-10-CM

## 2023-01-09 DIAGNOSIS — M25.512 CHRONIC LEFT SHOULDER PAIN: Primary | ICD-10-CM

## 2023-01-09 PROCEDURE — 97530 THERAPEUTIC ACTIVITIES: CPT | Performed by: PHYSICAL THERAPIST

## 2023-01-09 PROCEDURE — 97112 NEUROMUSCULAR REEDUCATION: CPT | Performed by: PHYSICAL THERAPIST

## 2023-01-09 PROCEDURE — 97110 THERAPEUTIC EXERCISES: CPT | Performed by: PHYSICAL THERAPIST

## 2023-01-09 NOTE — PROGRESS NOTES
Re-Assessment / Re-Certification        Patient: Thierry French   : 1990  Diagnosis/ICD-10 Code:  Chronic left shoulder pain [M25.512, G89.29]  Referring practitioner: Emmanuel Zambrano MD  Date of Initial Visit: Type: THERAPY  Noted: 2022  Today's Date: 2023  Patient seen for 4 sessions      Subjective:     Visit Diagnoses:    ICD-10-CM ICD-9-CM   1. Chronic left shoulder pain  M25.512 719.41    G89.29 338.29   2. Left arm weakness  R29.898 729.89       Clinical Progress: UNCHANGED  Home Program Compliance: YES  Treatment has included: therapeutic exercise, neuromuscular re-education, manual therapy and therapeutic activity      Subjective Evaluation    History of Present Illness  Mechanism of injury: No pain today.    Compliant with HEP.    Overhead motion continues to bother him.  He has some pain in the shoulder blade on the L with activity. He continues to work each day.  If he works close to body and not overhead for long periods, he does ok.  Even if he is overhead, if he moves his body to a good position, he can still perform tasks, just some fatigue noted.     Some of the shoulder pain has moved towards the back/top of the scapula and not in the pec and anterior shoulder like it was prior.           Objective          Palpation   Left   Tenderness of the infraspinatus, levator scapulae, lower trapezius, middle trapezius and upper trapezius.     Active Range of Motion   Left Shoulder   Flexion: 180 degrees   Extension: 40 degrees   Abduction: 165 degrees with pain  External rotation 0°: 70 degrees with pain  Internal rotation BTB: L5 with pain    Strength/Myotome Testing     Left Shoulder     Planes of Motion   Flexion: 4   Extension: 4   Abduction: 4-   External rotation at 0°: 4-   Internal rotation at 0°: 4     Right Shoulder   Normal muscle strength    Tests     Left Shoulder   Positive anterior load and shift, crossover, drop arm, empty can, Hawkin's and lift-off.         Assessment & Plan      Assessment  Impairments: abnormal muscle firing, abnormal or restricted ROM, activity intolerance, impaired physical strength and pain with function  Functional Limitations: carrying objects, lifting, pulling, pushing, uncomfortable because of pain, reaching behind back, reaching overhead and unable to perform repetitive tasks  Assessment details: Pt cont to show weakness and instability in the SHLD with exercises.  Posture/postural awareness has improved for Thierry.  His pain levels feel better when he is aware of posture and he has scapular retraction.  Continued to work on scapular and shoulder strengthening today, increasing resistance and stamina activities. Overall, ROM and strength are all progressing, other goals are partially being met.  Patient will continue to benefit from further PT services to address their remaining deficits noted and progress to achieve their goals.       Goals  Plan Goals: SHOULDER  PROBLEMS:     1. The patient has limited ROM of the left shoulder.    LTG 1: 12 weeks:  The patient will demonstrate 180 degrees of shoulder abduction, 80 degrees of shoulder external rotation, and 80 degrees of shoulder internal rotation to allow the patient to reach into upper kitchen cabinets and manipulate clothing behind the back with greater ease.    STATUS:  Progressing   STG 1a: 4 weeks:  The patient will demonstrate  165 degrees of shoulder abduction, 60 degrees of shoulder external rotation, and 60 degrees of shoulder internal rotation.    STATUS:  Partially met   TREATMENT: Manual therapy, therapeutic exercise, home exercise instruction, and modalities as needed to include: electrical stimulation, ultrasound, moist heat, and ice.    2. The patient has limited strength of the left shoulder.   LTG 2: 12 weeks:  The patient will demonstrate 4+ /5 strength for left shoulder flexion, abduction, external rotation, and internal rotation in order to demonstrate improved shoulder  stability.    STATUS:  Progressing   STG 2a: 4 weeks:  The patient will demonstrate 4 /5 strength for left shoulder flexion, abduction, external rotation, and internal rotation.    STATUS:  Partially met   STG2b:  4 weeks:  The patient will be independent with home exercises.     STATUS:  Met, tailor as needed   TREATMENT: Manual therapy, therapeutic exercise, home exercise instruction, and modalities as needed to include: electrical stimulation, ultrasound, moist heat, and ice.     3. The patient complains of pain to the left shoulder.   LTG 3: 12 weeks:  The patient will report a pain rating of 2 /10 or better in order to improve sleep quality and tolerance to performance of activities of daily living.    STATUS:  Met   STG 3a: 4 weeks:  The patient will report a pain rating of 4 /10 or better.     STATUS:  Met   TREATMENT: Manual therapy, therapeutic exercise, home exercise instruction, and modalities as needed to include: electrical stimulation, ultrasound, moist heat, and ice.    4. Carrying, Moving, and Handling Objects Functional Limitation     LTG 4: 12 weeks:  The patient will demonstrate 12 % limitation by achieving a score of  on the QuickDASH.    STATUS:  Progressing   STG 4a: 4 weeks:  The patient will demonstrate 18 % limitation by achieving a score of  on the QuickDASH.      STATUS:  Met   TREATMENT:  Manual therapy, therapeutic exercise, home exercise instruction, and modalities as needed to include: moist heat, electrical stimulation, and ultrasound.      Plan  Therapy options: will be seen for skilled therapy services  Planned modality interventions: cryotherapy, dry needling, electrical stimulation/Russian stimulation, TENS, thermotherapy (hydrocollator packs) and ultrasound  Planned therapy interventions: manual therapy, flexibility, functional ROM exercises, home exercise program, therapeutic activities, stretching, strengthening, soft tissue mobilization, postural training, neuromuscular  re-education, body mechanics training, ADL retraining, fine motor coordination training and joint mobilization  Frequency: 1x week  Duration in weeks: 8  Treatment plan discussed with: patient  Plan details: Continue to address L shoulder and scapular stabilization, postural awareness and strengthening, end range ROM for all directions, IR behind the back mobility.           Progress toward previous goals: Partially Met     Recommendations: Continue as planned  Timeframe: 2 months  Prognosis to achieve goals: good    Timed:  Manual Therapy:         mins  00175;  Therapeutic Exercise:    14     mins  19412;     Neuromuscular Ruben:    10    mins  62231;    Therapeutic Activity:     8     mins  88033;     Gait Training:           mins  03862;     Ultrasound:          mins  06810;    Canalith Repos           ___  mins  00985      Untimed:  Electrical Stimulation:         mins  80542 ( );  Mechanical Traction:         mins  47881;   Dry Needling:                     mins self pay  Re-Eval:                           mins  77750         Timed Treatment:   32   mins   Total Treatment:     32   mins          PT SIGNATURE: Alejandra Novoa PT, DPT  [place] License: [id number]      Certification Period: 1/9/2023 thru 4/8/2023  I certify that the therapy services are furnished while this patient is under my care.  The services outlined above are required by this patient, and will be reviewed every 90 days.     PHYSICIAN: Emmanuel Zambrano MD  NPI: [id number]      PHYSICIAN PRINT NAME: ______________________________________________      PHYSICIAN SIGNATURE: ______________________________________________         DATE:________________________________        Please sign and return via fax to [phone].  Thank you, [facility].

## 2023-01-16 ENCOUNTER — TREATMENT (OUTPATIENT)
Dept: PHYSICAL THERAPY | Facility: CLINIC | Age: 33
End: 2023-01-16
Payer: COMMERCIAL

## 2023-01-16 DIAGNOSIS — G89.29 CHRONIC LEFT SHOULDER PAIN: Primary | ICD-10-CM

## 2023-01-16 DIAGNOSIS — M25.512 CHRONIC LEFT SHOULDER PAIN: Primary | ICD-10-CM

## 2023-01-16 DIAGNOSIS — R29.898 LEFT ARM WEAKNESS: ICD-10-CM

## 2023-01-16 PROCEDURE — 97530 THERAPEUTIC ACTIVITIES: CPT | Performed by: PHYSICAL THERAPIST

## 2023-01-16 PROCEDURE — 97140 MANUAL THERAPY 1/> REGIONS: CPT | Performed by: PHYSICAL THERAPIST

## 2023-01-16 PROCEDURE — 97110 THERAPEUTIC EXERCISES: CPT | Performed by: PHYSICAL THERAPIST

## 2023-01-16 PROCEDURE — 97112 NEUROMUSCULAR REEDUCATION: CPT | Performed by: PHYSICAL THERAPIST

## 2023-01-16 NOTE — PROGRESS NOTES
Physical Therapy Daily Treatment Note      Patient: Thierry French   : 1990  Referring practitioner: Emmanuel Zambrano MD  Date of Initial Visit: Type: THERAPY  Noted: 2022  Today's Date: 2023  Patient seen for 5 sessions           Visit Diagnoses:    ICD-10-CM ICD-9-CM   1. Chronic left shoulder pain  M25.512 719.41    G89.29 338.29   2. Left arm weakness  R29.898 729.89       Subjective Evaluation    History of Present Illness    Subjective comment: The instability in the shoulder is getting better         Objective   See Exercise, Manual, and Modality Logs for complete treatment.       Assessment & Plan     Assessment  Impairments: abnormal muscle firing, abnormal or restricted ROM, activity intolerance, impaired physical strength and pain with function  Functional Limitations: carrying objects, lifting, pulling, pushing, uncomfortable because of pain, reaching behind back, reaching overhead and unable to perform repetitive tasks  Assessment details: Thierry is progressing with shoulder strength, endurance, and overhead work.  He is still getting fatigue with the shoulder, but it is getting better.  He was able to perform several overhead activities today, increased resistance, without pain.  KT used along the L shoulder to help with further stabilization.     Goals  Plan Goals: SHOULDER  PROBLEMS:     1. The patient has limited ROM of the left shoulder.    LTG 1: 12 weeks:  The patient will demonstrate 180 degrees of shoulder abduction, 80 degrees of shoulder external rotation, and 80 degrees of shoulder internal rotation to allow the patient to reach into upper kitchen cabinets and manipulate clothing behind the back with greater ease.    STATUS:  Progressing   STG 1a: 4 weeks:  The patient will demonstrate  165 degrees of shoulder abduction, 60 degrees of shoulder external rotation, and 60 degrees of shoulder internal rotation.    STATUS:  Partially met   TREATMENT: Manual therapy, therapeutic  exercise, home exercise instruction, and modalities as needed to include: electrical stimulation, ultrasound, moist heat, and ice.    2. The patient has limited strength of the left shoulder.   LTG 2: 12 weeks:  The patient will demonstrate 4+ /5 strength for left shoulder flexion, abduction, external rotation, and internal rotation in order to demonstrate improved shoulder stability.    STATUS:  Progressing   STG 2a: 4 weeks:  The patient will demonstrate 4 /5 strength for left shoulder flexion, abduction, external rotation, and internal rotation.    STATUS:  Partially met   STG2b:  4 weeks:  The patient will be independent with home exercises.     STATUS:  Met, tailor as needed   TREATMENT: Manual therapy, therapeutic exercise, home exercise instruction, and modalities as needed to include: electrical stimulation, ultrasound, moist heat, and ice.     3. The patient complains of pain to the left shoulder.   LTG 3: 12 weeks:  The patient will report a pain rating of 2 /10 or better in order to improve sleep quality and tolerance to performance of activities of daily living.    STATUS:  Met   STG 3a: 4 weeks:  The patient will report a pain rating of 4 /10 or better.     STATUS:  Met   TREATMENT: Manual therapy, therapeutic exercise, home exercise instruction, and modalities as needed to include: electrical stimulation, ultrasound, moist heat, and ice.    4. Carrying, Moving, and Handling Objects Functional Limitation     LTG 4: 12 weeks:  The patient will demonstrate 12 % limitation by achieving a score of  on the QuickDASH.    STATUS:  Progressing   STG 4a: 4 weeks:  The patient will demonstrate 18 % limitation by achieving a score of  on the QuickDASH.      STATUS:  Met   TREATMENT:  Manual therapy, therapeutic exercise, home exercise instruction, and modalities as needed to include: moist heat, electrical stimulation, and ultrasound.      Plan  Therapy options: will be seen for skilled therapy services  Planned  modality interventions: cryotherapy, dry needling, electrical stimulation/Russian stimulation, TENS, thermotherapy (hydrocollator packs) and ultrasound  Planned therapy interventions: manual therapy, flexibility, functional ROM exercises, home exercise program, therapeutic activities, stretching, strengthening, soft tissue mobilization, postural training, neuromuscular re-education, body mechanics training, ADL retraining, fine motor coordination training and joint mobilization  Frequency: 1x week  Duration in weeks: 8  Treatment plan discussed with: patient  Plan details: Continue to address L shoulder and scapular stabilization, postural awareness and strengthening, end range ROM for all directions, IR behind the back mobility.             Progress per Plan of Care and Progress strengthening /stabilization /functional activity           Timed:  Manual Therapy:    8     mins  16046;  Therapeutic Exercise:    12    mins  66215;     Neuromuscular Ruben:    10   mins  29501;    Therapeutic Activity:     8     mins  94900;     Gait Training:           mins  09773;     Ultrasound:          mins  19858;    Canalith Repos           ___  mins  70647      Untimed:  Electrical Stimulation:         mins  36491 ( );  Mechanical Traction:         mins  76424;   Dry Needling:                     mins self pay       Timed Treatment:   38   mins   Total Treatment:     38  mins      Alejandra Novoa PT, DPT  [place] License #: [id number]

## 2023-02-27 ENCOUNTER — DOCUMENTATION (OUTPATIENT)
Dept: PHYSICAL THERAPY | Facility: CLINIC | Age: 33
End: 2023-02-27
Payer: COMMERCIAL

## 2023-02-27 DIAGNOSIS — R29.898 LEFT ARM WEAKNESS: ICD-10-CM

## 2023-02-27 DIAGNOSIS — M25.512 CHRONIC LEFT SHOULDER PAIN: Primary | ICD-10-CM

## 2023-02-27 DIAGNOSIS — G89.29 CHRONIC LEFT SHOULDER PAIN: Primary | ICD-10-CM

## 2023-02-27 NOTE — PROGRESS NOTES
Discharge Summary  Discharge Summary from Physical Therapy Report    Patient Information  Thierry French  1990  Diagnosis/ICD - 10 Code:  Chronic left shoulder pain [M25.512, G89.29]  Referring practitoner: No ref. provider found  Date of initial visit: 2/27/2023  Today's date: 2/27/2023  Patient was seen for Visit count could not be calculated. Make sure you are using a visit which is associated with an episode. sessions      Visit Diagnoses:    ICD-10-CM ICD-9-CM   1. Chronic left shoulder pain  M25.512 719.41    G89.29 338.29   2. Left arm weakness  R29.898 729.89       Comments:  Patient was last seen on 1/23/23.  He has not called back for further appts, he will be discharged from PT services at this time.           Alejandra Novoa, PT, DPT  [place] License #: [id number]

## 2023-03-20 ENCOUNTER — OFFICE VISIT (OUTPATIENT)
Dept: FAMILY MEDICINE CLINIC | Age: 33
End: 2023-03-20
Payer: COMMERCIAL

## 2023-03-20 VITALS
DIASTOLIC BLOOD PRESSURE: 91 MMHG | HEART RATE: 72 BPM | SYSTOLIC BLOOD PRESSURE: 145 MMHG | WEIGHT: 244.6 LBS | BODY MASS INDEX: 36.23 KG/M2 | HEIGHT: 69 IN

## 2023-03-20 DIAGNOSIS — E66.9 OBESITY (BMI 30-39.9): ICD-10-CM

## 2023-03-20 DIAGNOSIS — I10 PRIMARY HYPERTENSION: Primary | ICD-10-CM

## 2023-03-20 DIAGNOSIS — Z72.0 TOBACCO ABUSE: ICD-10-CM

## 2023-03-20 PROCEDURE — 99214 OFFICE O/P EST MOD 30 MIN: CPT | Performed by: NURSE PRACTITIONER

## 2023-03-20 RX ORDER — LISINOPRIL 10 MG/1
10 TABLET ORAL DAILY
Qty: 45 TABLET | Refills: 0 | Status: SHIPPED | OUTPATIENT
Start: 2023-03-20

## 2023-03-20 NOTE — ASSESSMENT & PLAN NOTE
Hypertension is newly identified.  Dietary sodium restriction.  Weight loss.  Regular aerobic exercise.  Stop smoking.  Ambulatory blood pressure monitoring.  Start lisinopril 10 mg once per day, patient educated on medication side effects  Blood pressure will be reassessed in 4 weeks.

## 2023-03-20 NOTE — ASSESSMENT & PLAN NOTE
Patient's (Body mass index is 36.12 kg/m².) indicates that they are obese (BMI >30) with health conditions that include hypertension . Weight is worsening. BMI  is above average; BMI management plan is completed. We discussed portion control, increasing exercise and referring to nutritionist .

## 2023-03-20 NOTE — PROGRESS NOTES
"Thierry French presents to [facility] with complaint of  Hypertension (140/102 yesterady, h/a)    SUBJECTIVE  History of Present Illness     Patient is being seen in the office today for elevated blood pressure.  In the past, patient has borderline blood pressure readings.  Patient reports that his blood pressure at home was 140/102 yesterday and he was having a headache.  Patient denies any chest pain, heart palpitations, or shortness of air.  Patient denies sensation of dizziness but says that he feels \"funny\" when his blood pressure is high.  Patient has not been on antihypertensive in the past.  Patient is still trying to quit smoking.  He has been cigarette free for 6 weeks.  Patient has gained 9 pounds since November.    The following portions of the patient's history were reviewed and updated as appropriate: allergies, current medications, past family history, past medical history, past social history, past surgical history and problem list.  OBJECTIVE  Vital Signs:   /91 (BP Location: Left arm, Patient Position: Sitting)   Pulse 72   Ht 175.3 cm (69\")   Wt 111 kg (244 lb 9.6 oz)   BMI 36.12 kg/m²       Physical Exam  Constitutional:       General: He is not in acute distress.     Appearance: Normal appearance. He is obese. He is not ill-appearing.   HENT:      Head: Normocephalic and atraumatic.      Nose: Nose normal.      Mouth/Throat:      Pharynx: Oropharynx is clear.   Cardiovascular:      Rate and Rhythm: Normal rate and regular rhythm.      Pulses: Normal pulses.      Heart sounds: Normal heart sounds.   Pulmonary:      Effort: Pulmonary effort is normal. No respiratory distress.      Breath sounds: Normal breath sounds.   Chest:      Chest wall: No tenderness.   Musculoskeletal:         General: Normal range of motion.      Cervical back: Normal range of motion and neck supple.   Skin:     General: Skin is warm and dry.   Neurological:      General: No focal " deficit present.      Mental Status: He is alert and oriented to person, place, and time. Mental status is at baseline.   Psychiatric:         Mood and Affect: Mood normal.         Behavior: Behavior normal.          Results Review:  The following data was reviewed by Camila Julian, MARCUS [unfilled] 14:22 EDT.  Hemoglobin A1c (06/28/2022 11:23)  TSH (06/28/2022 11:23)  Magnesium (06/28/2022 11:23)  Lipid Panel (06/28/2022 11:23)  Comprehensive Metabolic Panel (06/28/2022 11:23)  CBC & Differential (06/28/2022 11:23)      ASSESSMENT AND PLAN:  Diagnoses and all orders for this visit:    1. Primary hypertension (Primary)  Assessment & Plan:  Hypertension is newly identified.  Dietary sodium restriction.  Weight loss.  Regular aerobic exercise.  Stop smoking.  Ambulatory blood pressure monitoring.  Start lisinopril 10 mg once per day, patient educated on medication side effects  Blood pressure will be reassessed in 4 weeks.    Orders:  -     lisinopril (PRINIVIL,ZESTRIL) 10 MG tablet; Take 1 tablet by mouth Daily.  Dispense: 45 tablet; Refill: 0  -     Ambulatory Referral to Nutrition Services    2. Obesity (BMI 30-39.9)  Assessment & Plan:  Patient's (Body mass index is 36.12 kg/m².) indicates that they are obese (BMI >30) with health conditions that include hypertension . Weight is worsening. BMI  is above average; BMI management plan is completed. We discussed portion control, increasing exercise and referring to nutritionist .     Orders:  -     Ambulatory Referral to Nutrition Services    3. Tobacco abuse  Assessment & Plan:  Patient was encouraged to continue nicotine cessation.  Patient was educated that nicotine use can increase blood pressure.        Follow Up   Return in about 1 month (around 4/20/2023). Patient to notify office with any acute concerns or issues.  Patient verbalizes understanding, agrees with plan of care and has no further questions upon discharge.     Patient was given instructions and counseling  regarding his condition or for health maintenance advice. Please see specific information pulled into the AVS if appropriate.     Discussed the importance of following up with any needed screening tests/labs/specialist appointments and any requested follow-up recommended by me today. Importance of maintaining follow-up discussed and patient accepts that missed appointments can delay diagnosis and potentially lead to worsening of conditions.    Part of this note may be an electronic transcription/translation of spoken language to printed text using the Dragon Dictation System.

## 2023-03-20 NOTE — ASSESSMENT & PLAN NOTE
Patient was encouraged to continue nicotine cessation.  Patient was educated that nicotine use can increase blood pressure.

## 2023-04-20 ENCOUNTER — HOSPITAL ENCOUNTER (OUTPATIENT)
Dept: DIABETES SERVICES | Facility: HOSPITAL | Age: 33
Discharge: HOME | End: 2023-04-20
Admitting: NURSE PRACTITIONER
Payer: COMMERCIAL

## 2023-04-20 ENCOUNTER — OFFICE VISIT (OUTPATIENT)
Dept: FAMILY MEDICINE CLINIC | Age: 33
End: 2023-04-20
Payer: COMMERCIAL

## 2023-04-20 VITALS
HEIGHT: 69 IN | OXYGEN SATURATION: 98 % | TEMPERATURE: 98.2 F | WEIGHT: 234 LBS | DIASTOLIC BLOOD PRESSURE: 63 MMHG | HEART RATE: 67 BPM | SYSTOLIC BLOOD PRESSURE: 117 MMHG | BODY MASS INDEX: 34.66 KG/M2

## 2023-04-20 DIAGNOSIS — I10 PRIMARY HYPERTENSION: Primary | ICD-10-CM

## 2023-04-20 DIAGNOSIS — E66.9 OBESITY (BMI 30-39.9): ICD-10-CM

## 2023-04-20 PROCEDURE — 97802 MEDICAL NUTRITION INDIV IN: CPT

## 2023-04-20 RX ORDER — LISINOPRIL 10 MG/1
10 TABLET ORAL DAILY
Qty: 90 TABLET | Refills: 1 | Status: SHIPPED | OUTPATIENT
Start: 2023-04-20

## 2023-04-20 NOTE — ASSESSMENT & PLAN NOTE
Hypertension is improving with treatment.  Continue current treatment regimen.  Dietary sodium restriction.  Weight loss.  Regular aerobic exercise.  Continue current medications.  Ambulatory blood pressure monitoring.  Continue lisinopril 10 mg once per day  Blood pressure will be reassessed 6 months.  Patient was encouraged to keep a close eye on his blood pressure at home.  If blood pressure is consistently less than 110/70, this may indicate that he may no longer need lisinopril due to his positive lifestyle changes. Notify office if this occurs. Offered to schedule follow-up appointment today but patient says he will call back when closer to time.

## 2023-04-20 NOTE — ASSESSMENT & PLAN NOTE
Patient's (Body mass index is 34.54 kg/m².) indicates that they are obese (BMI >30) with health conditions that include hypertension . Weight is improving with lifestyle modifications. BMI  is above average; BMI management plan is completed. We discussed portion control and increasing exercise.  Patient was congratulated for weight loss so far.  He was encouraged to keep up the good work.

## 2023-04-20 NOTE — PROGRESS NOTES
"Thierry French presents to [facility] with complaint of  Hypertension    SUBJECTIVE  History of Present Illness     Patient is here for 1 month follow-up of hypertension.  At his last visit, he was started on lisinopril 10 mg once per day.  He does not check his blood pressure at home daily but he reports that his average blood pressure runs around 128/81.  He is tolerating the lisinopril well not experiencing any side effects.  Since his last visit, he has also stopped smoking.  He has also lost 10 pounds in the past month.  He has been trying to exercise and eat healthier.      OBJECTIVE  Vital Signs:   /63 (BP Location: Right arm, Patient Position: Sitting, Cuff Size: Adult)   Pulse 67   Temp 98.2 °F (36.8 °C) (Oral)   Ht 175.3 cm (69.02\")   Wt 106 kg (234 lb)   SpO2 98%   BMI 34.54 kg/m²       Physical Exam  Constitutional:       General: He is not in acute distress.     Appearance: Normal appearance. He is obese. He is not ill-appearing.   HENT:      Head: Normocephalic and atraumatic.      Nose: Nose normal.      Mouth/Throat:      Pharynx: Oropharynx is clear.   Cardiovascular:      Rate and Rhythm: Normal rate and regular rhythm.      Pulses: Normal pulses.      Heart sounds: Normal heart sounds.   Pulmonary:      Effort: Pulmonary effort is normal. No respiratory distress.      Breath sounds: Normal breath sounds.   Chest:      Chest wall: No tenderness.   Musculoskeletal:         General: Normal range of motion.      Cervical back: Normal range of motion and neck supple.   Skin:     General: Skin is warm and dry.   Neurological:      General: No focal deficit present.      Mental Status: He is alert and oriented to person, place, and time. Mental status is at baseline.   Psychiatric:         Mood and Affect: Mood normal.         Behavior: Behavior normal.          ASSESSMENT AND PLAN:  Diagnoses and all orders for this visit:    1. Primary hypertension " (Primary)  Assessment & Plan:  Hypertension is improving with treatment.  Continue current treatment regimen.  Dietary sodium restriction.  Weight loss.  Regular aerobic exercise.  Continue current medications.  Ambulatory blood pressure monitoring.  Continue lisinopril 10 mg once per day  Blood pressure will be reassessed 6 months.  Patient was encouraged to keep a close eye on his blood pressure at home.  If blood pressure is consistently less than 110/70, this may indicate that he may no longer need lisinopril due to his positive lifestyle changes. Notify office if this occurs. Offered to schedule follow-up appointment today but patient says he will call back when closer to time.    Orders:  -     lisinopril (PRINIVIL,ZESTRIL) 10 MG tablet; Take 1 tablet by mouth Daily.  Dispense: 90 tablet; Refill: 1    2. Obesity (BMI 30-39.9)  Assessment & Plan:  Patient's (Body mass index is 34.54 kg/m².) indicates that they are obese (BMI >30) with health conditions that include hypertension . Weight is improving with lifestyle modifications. BMI  is above average; BMI management plan is completed. We discussed portion control and increasing exercise.  Patient was congratulated for weight loss so far.  He was encouraged to keep up the good work.          Follow Up   Return in about 6 months (around 10/20/2023). Patient to notify office with any acute concerns or issues.  Patient verbalizes understanding, agrees with plan of care and has no further questions upon discharge.     Patient was given instructions and counseling regarding his condition or for health maintenance advice. Please see specific information pulled into the AVS if appropriate.     Discussed the importance of following up with any needed screening tests/labs/specialist appointments and any requested follow-up recommended by me today. Importance of maintaining follow-up discussed and patient accepts that missed appointments can delay diagnosis and  potentially lead to worsening of conditions.    Part of this note may be an electronic transcription/translation of spoken language to printed text using the Dragon Dictation System.

## 2023-04-20 NOTE — CONSULTS
Thierry met with Registered Dietitian for MNT/diet education for HTN. A comprehensive assessment/training record has been sent to medical records (see media tab).    Family history of HTN. Has started checking blood pressure at home, reports improvement since starting lisinopril.   Dietary guidelines, labels, meal planning discussed. Takes a cooler with him to work. Discussed options to pack with him. Practiced looking at nutrition labels.    Thierry has been encouraged to call our office with questions or additional education needs. Please place referral for additional services or follow-up should need arise.     Thank you for the referral.

## 2023-10-21 DIAGNOSIS — I10 PRIMARY HYPERTENSION: ICD-10-CM

## 2023-10-23 RX ORDER — LISINOPRIL 10 MG/1
10 TABLET ORAL DAILY
Qty: 30 TABLET | Refills: 0 | Status: SHIPPED | OUTPATIENT
Start: 2023-10-23 | End: 2023-10-24 | Stop reason: SDUPTHER

## 2023-10-23 NOTE — TELEPHONE ENCOUNTER
Rx Refill Note  Requested Prescriptions     Pending Prescriptions Disp Refills    lisinopril (PRINIVIL,ZESTRIL) 10 MG tablet [Pharmacy Med Name: LISINOPRIL 10MG TABLETS] 90 tablet 1     Sig: TAKE 1 TABLET BY MOUTH DAILY      Last office visit with prescribing clinician: 4/20/2023     Return in about 6 months (around 10/20/2023).      Next office visit with prescribing clinician: 10/24/23    Tianna Pereyra LPN  10/23/23, 09:32 EDT

## 2023-10-24 ENCOUNTER — OFFICE VISIT (OUTPATIENT)
Dept: FAMILY MEDICINE CLINIC | Age: 33
End: 2023-10-24
Payer: COMMERCIAL

## 2023-10-24 VITALS
BODY MASS INDEX: 36.11 KG/M2 | TEMPERATURE: 98.2 F | HEIGHT: 69 IN | SYSTOLIC BLOOD PRESSURE: 124 MMHG | WEIGHT: 243.8 LBS | DIASTOLIC BLOOD PRESSURE: 80 MMHG | HEART RATE: 69 BPM

## 2023-10-24 DIAGNOSIS — G89.29 CHRONIC LEFT SHOULDER PAIN: ICD-10-CM

## 2023-10-24 DIAGNOSIS — I10 PRIMARY HYPERTENSION: ICD-10-CM

## 2023-10-24 DIAGNOSIS — Z00.00 ANNUAL PHYSICAL EXAM: Primary | ICD-10-CM

## 2023-10-24 DIAGNOSIS — Z72.0 TOBACCO ABUSE: ICD-10-CM

## 2023-10-24 DIAGNOSIS — M25.512 CHRONIC LEFT SHOULDER PAIN: ICD-10-CM

## 2023-10-24 DIAGNOSIS — Z00.00 ROUTINE ADULT HEALTH MAINTENANCE: ICD-10-CM

## 2023-10-24 DIAGNOSIS — E66.9 OBESITY (BMI 30-39.9): ICD-10-CM

## 2023-10-24 DIAGNOSIS — Z13.220 SCREENING FOR LIPID DISORDERS: ICD-10-CM

## 2023-10-24 DIAGNOSIS — Z13.29 SCREENING FOR THYROID DISORDER: ICD-10-CM

## 2023-10-24 DIAGNOSIS — Z28.21 INFLUENZA VACCINATION DECLINED: ICD-10-CM

## 2023-10-24 RX ORDER — LISINOPRIL 10 MG/1
10 TABLET ORAL DAILY
Qty: 90 TABLET | Refills: 1 | Status: SHIPPED | OUTPATIENT
Start: 2023-10-24

## 2023-10-24 NOTE — ASSESSMENT & PLAN NOTE
Hypertension is  controlled .  Continue current treatment regimen.  Blood pressure will be reassessed  6 months .

## 2023-10-24 NOTE — ASSESSMENT & PLAN NOTE
Patient's (Body mass index is 35.98 kg/m².) indicates that they are obese (BMI >30) with health conditions that include hypertension . Weight is unchanged. BMI  is above average; BMI management plan is completed. We discussed portion control and increasing exercise.

## 2023-10-24 NOTE — ASSESSMENT & PLAN NOTE
Thierry French  reports that he quit smoking about 16 months ago. His smoking use included cigarettes. He has a 1.25 pack-year smoking history. He has been exposed to tobacco smoke. His smokeless tobacco use includes chew.. I have educated him on the risk of diseases from using tobacco products such as cancer, COPD, heart disease, stroke.     I advised him to quit and he is trying to stop.     I spent 3  minutes counseling the patient.

## 2023-10-24 NOTE — PROGRESS NOTES
"Thierry French presents to [facility] with complaint of  Annual Exam    SUBJECTIVE  History of Present Illness    Patient is here for annual exam and follow up of HTN.  He is currently on lisinopril 10 mg daily.  He is compliant with this medication, does not report any side effects.  His initial blood pressure is elevated in office today 136/83, manual recheck is 124/80.  His blood pressure at home runs 120s to 130s systolic. The following portions of the patient's history were reviewed and updated as appropriate: allergies, current medications, past family history, past medical history, past social history, past surgical history and problem list.  He is due for screening labs.  Patient has decreased amount of smoking but is not completely tobacco free.  Patient's weight is increasing his last visit, however patient is wearing still toed boots today.  Patient does go to the gym regularly.    OBJECTIVE  Vital Signs:   /80   Pulse 69   Temp 98.2 °F (36.8 °C) (Oral)   Ht 175.3 cm (69.02\")   Wt 111 kg (243 lb 12.8 oz)   BMI 35.98 kg/m²       Physical Exam  Constitutional:       General: He is not in acute distress.     Appearance: Normal appearance. He is obese. He is not ill-appearing.   HENT:      Head: Normocephalic and atraumatic.      Right Ear: Tympanic membrane and ear canal normal.      Left Ear: Tympanic membrane and ear canal normal.      Nose: Nose normal.      Mouth/Throat:      Pharynx: Oropharynx is clear.   Neck:      Thyroid: No thyroid mass, thyromegaly or thyroid tenderness.   Cardiovascular:      Rate and Rhythm: Normal rate and regular rhythm.      Pulses: Normal pulses.      Heart sounds: Normal heart sounds.   Pulmonary:      Effort: Pulmonary effort is normal. No respiratory distress.      Breath sounds: Normal breath sounds.   Chest:      Chest wall: No tenderness.   Musculoskeletal:         General: Normal range of motion.      Cervical back: Normal " range of motion and neck supple.   Lymphadenopathy:      Cervical: No cervical adenopathy.   Skin:     General: Skin is warm and dry.   Neurological:      General: No focal deficit present.      Mental Status: He is alert and oriented to person, place, and time. Mental status is at baseline.   Psychiatric:         Mood and Affect: Mood normal.         Behavior: Behavior normal.            ASSESSMENT AND PLAN:  Diagnoses and all orders for this visit:    1. Annual physical exam (Primary)    2. Routine adult health maintenance  -     CBC & Differential; Future  -     Comprehensive Metabolic Panel; Future    3. Screening for thyroid disorder  -     TSH Rfx On Abnormal To Free T4; Future    4. Screening for lipid disorders  -     Lipid Panel; Future    5. Obesity (BMI 30-39.9)  Assessment & Plan:  Patient's (Body mass index is 35.98 kg/m².) indicates that they are obese (BMI >30) with health conditions that include hypertension . Weight is unchanged. BMI  is above average; BMI management plan is completed. We discussed portion control and increasing exercise.       6. Primary hypertension  Assessment & Plan:  Hypertension is  controlled .  Continue current treatment regimen.  Blood pressure will be reassessed  6 months .    Orders:  -     lisinopril (PRINIVIL,ZESTRIL) 10 MG tablet; Take 1 tablet by mouth Daily.  Dispense: 90 tablet; Refill: 1    7. Tobacco abuse  Assessment & Plan:  Thierry French  reports that he quit smoking about 16 months ago. His smoking use included cigarettes. He has a 1.25 pack-year smoking history. He has been exposed to tobacco smoke. His smokeless tobacco use includes chew.. I have educated him on the risk of diseases from using tobacco products such as cancer, COPD, heart disease, stroke.     I advised him to quit and he is trying to stop.     I spent 3  minutes counseling the patient.        8. Chronic left shoulder pain  Assessment & Plan:  Sees ortho      9. Influenza vaccination  declined        19 to 39: Counseling/Anticipatory Guidance Discussed: nutrition, physical activity, healthy weight, misuse of tobacco, alcohol and drugs, dental health, mental health, and immunizations    Follow Up   No follow-ups on file. Patient to notify office with any acute concerns or issues.  Patient verbalizes understanding, agrees with plan of care and has no further questions upon discharge.     Patient was given instructions and counseling regarding his condition or for health maintenance advice. Please see specific information pulled into the AVS if appropriate.     Discussed the importance of following up with any needed screening tests/labs/specialist appointments and any requested follow-up recommended by me today. Importance of maintaining follow-up discussed and patient accepts that missed appointments can delay diagnosis and potentially lead to worsening of conditions.    Part of this note may be an electronic transcription/translation of spoken language to printed text using the Dragon Dictation System.

## 2023-11-28 ENCOUNTER — TELEPHONE (OUTPATIENT)
Dept: FAMILY MEDICINE CLINIC | Age: 33
End: 2023-11-28
Payer: COMMERCIAL

## 2023-12-04 DIAGNOSIS — M25.512 LEFT SHOULDER PAIN, UNSPECIFIED CHRONICITY: Primary | ICD-10-CM

## 2023-12-07 ENCOUNTER — LAB (OUTPATIENT)
Dept: LAB | Facility: HOSPITAL | Age: 33
End: 2023-12-07
Payer: COMMERCIAL

## 2023-12-07 ENCOUNTER — OFFICE VISIT (OUTPATIENT)
Dept: ORTHOPEDIC SURGERY | Facility: CLINIC | Age: 33
End: 2023-12-07
Payer: COMMERCIAL

## 2023-12-07 ENCOUNTER — TELEPHONE (OUTPATIENT)
Dept: FAMILY MEDICINE CLINIC | Age: 33
End: 2023-12-07
Payer: COMMERCIAL

## 2023-12-07 ENCOUNTER — HOSPITAL ENCOUNTER (OUTPATIENT)
Dept: GENERAL RADIOLOGY | Facility: HOSPITAL | Age: 33
Discharge: HOME | End: 2023-12-07
Payer: COMMERCIAL

## 2023-12-07 VITALS — TEMPERATURE: 98.6 F | BODY MASS INDEX: 34.07 KG/M2 | HEIGHT: 69 IN | WEIGHT: 230 LBS

## 2023-12-07 DIAGNOSIS — M25.512 LEFT SHOULDER PAIN, UNSPECIFIED CHRONICITY: ICD-10-CM

## 2023-12-07 DIAGNOSIS — M25.512 CHRONIC LEFT SHOULDER PAIN: Primary | ICD-10-CM

## 2023-12-07 DIAGNOSIS — Z13.220 SCREENING FOR LIPID DISORDERS: ICD-10-CM

## 2023-12-07 DIAGNOSIS — Z00.00 ROUTINE ADULT HEALTH MAINTENANCE: ICD-10-CM

## 2023-12-07 DIAGNOSIS — G89.29 CHRONIC LEFT SHOULDER PAIN: Primary | ICD-10-CM

## 2023-12-07 DIAGNOSIS — Z13.29 SCREENING FOR THYROID DISORDER: ICD-10-CM

## 2023-12-07 LAB
ALBUMIN SERPL-MCNC: 5 G/DL (ref 3.5–5.2)
ALBUMIN/GLOB SERPL: 1.8 G/DL
ALP SERPL-CCNC: 55 U/L (ref 39–117)
ALT SERPL W P-5'-P-CCNC: 76 U/L (ref 1–41)
ANION GAP SERPL CALCULATED.3IONS-SCNC: 11.4 MMOL/L (ref 5–15)
AST SERPL-CCNC: 39 U/L (ref 1–40)
BASOPHILS # BLD AUTO: 0.03 10*3/MM3 (ref 0–0.2)
BASOPHILS NFR BLD AUTO: 0.5 % (ref 0–1.5)
BILIRUB SERPL-MCNC: 0.5 MG/DL (ref 0–1.2)
BUN SERPL-MCNC: 19 MG/DL (ref 6–20)
BUN/CREAT SERPL: 16.7 (ref 7–25)
CALCIUM SPEC-SCNC: 9.7 MG/DL (ref 8.6–10.5)
CHLORIDE SERPL-SCNC: 102 MMOL/L (ref 98–107)
CHOLEST SERPL-MCNC: 154 MG/DL (ref 0–200)
CO2 SERPL-SCNC: 24.6 MMOL/L (ref 22–29)
CREAT SERPL-MCNC: 1.14 MG/DL (ref 0.76–1.27)
DEPRECATED RDW RBC AUTO: 40.4 FL (ref 37–54)
EGFRCR SERPLBLD CKD-EPI 2021: 87.6 ML/MIN/1.73
EOSINOPHIL # BLD AUTO: 0.06 10*3/MM3 (ref 0–0.4)
EOSINOPHIL NFR BLD AUTO: 1 % (ref 0.3–6.2)
ERYTHROCYTE [DISTWIDTH] IN BLOOD BY AUTOMATED COUNT: 12.4 % (ref 12.3–15.4)
GLOBULIN UR ELPH-MCNC: 2.8 GM/DL
GLUCOSE SERPL-MCNC: 104 MG/DL (ref 65–99)
HCT VFR BLD AUTO: 47.3 % (ref 37.5–51)
HDLC SERPL-MCNC: 28 MG/DL (ref 40–60)
HGB BLD-MCNC: 16.1 G/DL (ref 13–17.7)
IMM GRANULOCYTES # BLD AUTO: 0.01 10*3/MM3 (ref 0–0.05)
IMM GRANULOCYTES NFR BLD AUTO: 0.2 % (ref 0–0.5)
LDLC SERPL CALC-MCNC: 108 MG/DL (ref 0–100)
LDLC/HDLC SERPL: 3.81 {RATIO}
LYMPHOCYTES # BLD AUTO: 2.27 10*3/MM3 (ref 0.7–3.1)
LYMPHOCYTES NFR BLD AUTO: 39.3 % (ref 19.6–45.3)
MCH RBC QN AUTO: 29.9 PG (ref 26.6–33)
MCHC RBC AUTO-ENTMCNC: 34 G/DL (ref 31.5–35.7)
MCV RBC AUTO: 87.8 FL (ref 79–97)
MONOCYTES # BLD AUTO: 0.55 10*3/MM3 (ref 0.1–0.9)
MONOCYTES NFR BLD AUTO: 9.5 % (ref 5–12)
NEUTROPHILS NFR BLD AUTO: 2.86 10*3/MM3 (ref 1.7–7)
NEUTROPHILS NFR BLD AUTO: 49.5 % (ref 42.7–76)
PLATELET # BLD AUTO: 204 10*3/MM3 (ref 140–450)
PMV BLD AUTO: 11.8 FL (ref 6–12)
POTASSIUM SERPL-SCNC: 4.9 MMOL/L (ref 3.5–5.2)
PROT SERPL-MCNC: 7.8 G/DL (ref 6–8.5)
RBC # BLD AUTO: 5.39 10*6/MM3 (ref 4.14–5.8)
SODIUM SERPL-SCNC: 138 MMOL/L (ref 136–145)
TRIGL SERPL-MCNC: 97 MG/DL (ref 0–150)
TSH SERPL DL<=0.05 MIU/L-ACNC: 0.46 UIU/ML (ref 0.27–4.2)
VLDLC SERPL-MCNC: 18 MG/DL (ref 5–40)
WBC NRBC COR # BLD AUTO: 5.78 10*3/MM3 (ref 3.4–10.8)

## 2023-12-07 PROCEDURE — 99213 OFFICE O/P EST LOW 20 MIN: CPT | Performed by: ORTHOPAEDIC SURGERY

## 2023-12-07 PROCEDURE — 80061 LIPID PANEL: CPT

## 2023-12-07 PROCEDURE — 36415 COLL VENOUS BLD VENIPUNCTURE: CPT

## 2023-12-07 PROCEDURE — 73030 X-RAY EXAM OF SHOULDER: CPT

## 2023-12-07 PROCEDURE — 80050 GENERAL HEALTH PANEL: CPT

## 2023-12-07 NOTE — PROGRESS NOTES
"Chief Complaint  Follow-up and Pain of the Left Shoulder    Subjective    History of Present Illness      Thierry French is a 33 y.o. male who presents to [facility] for follow-up on left shoulder pain and discomfort.  History of Present Illness the patient states that he is about status quo as far as his shoulder function is concerned.  He does have some pain and discomfort in the shoulder especially when he is working it aggressively and repetitively.  He states that overall his symptoms are about the same depending on how he utilizes his shoulder.  He states that there are some positions in which his shoulder will get tweaked and then he has quite a bit of pain and discomfort.  Other than that he does not have any history of shoulder dislocation or instability as such.  Pain Location: LEFT shoulder  Radiation: none  Quality: dull, aching  Intensity/Severity: moderate  Duration:  11+ years  Progression of symptoms: no worsening, symptoms stable/unchanged  Onset quality: gradual   Timing: intermittent  Aggravating Factors: reaching across body, repetitive motion  Alleviating Factors: NSAIDs  Previous Episodes: yes  Associated Symptoms: pain, decreased strength  ADLs Affected: work related activities  Previous Treatment: NSAIDs       Objective   Vital Signs:   Temp 98.6 °F (37 °C)   Ht 175.3 cm (69.02\")   Wt 104 kg (230 lb)   BMI 33.95 kg/m²     Physical Exam  Physical Exam  Vitals signs and nursing note reviewed.   Constitutional:       Appearance: Normal appearance.   Pulmonary:      Effort: Pulmonary effort is normal.   Skin:     General: Skin is warm and dry.      Capillary Refill: Capillary refill takes less than 2 seconds.   Neurological:      General: No focal deficit present.      Mental Status: He is alert and oriented to person, place, and time. Mental status is at baseline.   Psychiatric:         Mood and Affect: Mood normal.         Behavior: Behavior normal.         " Thought Content: Thought content normal.         Judgment: Judgment normal.     Ortho Exam   Left shoulder (impingement). Patient has signs of impingement with internal and external rotation. There is a lot of pain and tenderness for the patient over the subcromial bursa. Ceron's sign is positive. Neer sign is positive. Forward flexion is 0-120 degrees, abduction is 0-120 degrees, external rotation is 0-30 degrees. Rotator cuff function is fairly well preserved except for the impingement at 90 degrees. Apprehension sign is negative. Axillary nerve function is well preserved. Radial artery pulses are palpable. There is no evidence of multidirectional instability. Sulcus sign is negative. Drop arm sign is negative. The patient has some ill-defined tenderness over the greater tuberosity of the humerus. The pain level is 2.          Result Review :  The following data was reviewed by: Emmanuel Zambrano MD on 12/07/2023:  Radiologic studies - see below for interpretation  xrays obtained today    left Shoulder X-Ray  Indication: Evaluation of pain and discomfort over the AC joint.  AP and Lateral  Findings: Sclerosis over the greater tuberosity of the humerus.  No fractures are noted.  no bony lesion  Soft tissues within normal limits  within normal limits joint spaces  Hardware appropriately positioned not applicable      yes prior studies available for comparison.    X-RAY was ordered and reviewed by Emmanuel Zambrano MD           Procedures           Assessment   Assessment and Plan    Diagnoses and all orders for this visit:    1. Chronic left shoulder pain (Primary)          Follow Up   Calcium and vitamin D for bone health.  Glucosamine, chondroitin and turmeric for cartilage health.  Intra-articular steroid injection and viscosupplementation injections discussed with the patient.  Nonoperative management as long as possible discussed with the patient.  If the symptoms become worse he will need an MRI to make sure he does  not have a labrum tear versus an occult rotator cuff tear that might require surgical consideration.  Rest, ice, compression, and elevation (RICE) therapy  Stretching and strengthening exercises of the flexors and abductors of the shoulder.  OTC Alternate Ibuprofen and Tylenol as needed  Follow up as needed  Patient was given instructions and counseling regarding his condition or for health maintenance advice. Please see specific information pulled into the AVS if appropriate.     Emmanuel Zambrano MD   Date of Encounter: 12/7/2023   Electronically signed by Emmanuel Zambrano MD, 12/07/23, 8:55 AM EST.     EMR Dragon/Transcription disclaimer:  Much of this encounter note is an electronic transcription/translation of spoken language to printed text. The electronic translation of spoken language may permit erroneous, or at times, nonsensical words or phrases to be inadvertently transcribed; Although I have reviewed the note for such errors, some may still exist.

## 2023-12-11 DIAGNOSIS — R74.01 ELEVATED ALT MEASUREMENT: Primary | ICD-10-CM

## 2024-02-12 ENCOUNTER — TELEPHONE (OUTPATIENT)
Dept: FAMILY MEDICINE CLINIC | Age: 34
End: 2024-02-12
Payer: COMMERCIAL

## 2024-03-25 ENCOUNTER — LAB (OUTPATIENT)
Dept: LAB | Facility: HOSPITAL | Age: 34
End: 2024-03-25
Payer: COMMERCIAL

## 2024-03-25 DIAGNOSIS — R74.01 ELEVATED ALT MEASUREMENT: ICD-10-CM

## 2024-03-25 LAB
ALBUMIN SERPL-MCNC: 4.6 G/DL (ref 3.5–5.2)
ALBUMIN/GLOB SERPL: 1.5 G/DL
ALP SERPL-CCNC: 58 U/L (ref 39–117)
ALT SERPL W P-5'-P-CCNC: 48 U/L (ref 1–41)
ANION GAP SERPL CALCULATED.3IONS-SCNC: 9.1 MMOL/L (ref 5–15)
AST SERPL-CCNC: 24 U/L (ref 1–40)
BILIRUB SERPL-MCNC: 0.4 MG/DL (ref 0–1.2)
BUN SERPL-MCNC: 16 MG/DL (ref 6–20)
BUN/CREAT SERPL: 15.2 (ref 7–25)
CALCIUM SPEC-SCNC: 9.6 MG/DL (ref 8.6–10.5)
CHLORIDE SERPL-SCNC: 102 MMOL/L (ref 98–107)
CO2 SERPL-SCNC: 26.9 MMOL/L (ref 22–29)
CREAT SERPL-MCNC: 1.05 MG/DL (ref 0.76–1.27)
EGFRCR SERPLBLD CKD-EPI 2021: 96.7 ML/MIN/1.73
GLOBULIN UR ELPH-MCNC: 3.1 GM/DL
GLUCOSE SERPL-MCNC: 103 MG/DL (ref 65–99)
POTASSIUM SERPL-SCNC: 4.8 MMOL/L (ref 3.5–5.2)
PROT SERPL-MCNC: 7.7 G/DL (ref 6–8.5)
SODIUM SERPL-SCNC: 138 MMOL/L (ref 136–145)

## 2024-03-25 PROCEDURE — 80053 COMPREHEN METABOLIC PANEL: CPT

## 2024-03-25 PROCEDURE — 36415 COLL VENOUS BLD VENIPUNCTURE: CPT

## 2024-03-29 ENCOUNTER — OFFICE VISIT (OUTPATIENT)
Dept: FAMILY MEDICINE CLINIC | Age: 34
End: 2024-03-29
Payer: COMMERCIAL

## 2024-03-29 VITALS
SYSTOLIC BLOOD PRESSURE: 128 MMHG | BODY MASS INDEX: 36.08 KG/M2 | WEIGHT: 243.6 LBS | DIASTOLIC BLOOD PRESSURE: 85 MMHG | HEIGHT: 69 IN | HEART RATE: 64 BPM

## 2024-03-29 DIAGNOSIS — F41.8 SITUATIONAL ANXIETY: ICD-10-CM

## 2024-03-29 DIAGNOSIS — I10 PRIMARY HYPERTENSION: Primary | ICD-10-CM

## 2024-03-29 PROCEDURE — 99214 OFFICE O/P EST MOD 30 MIN: CPT | Performed by: NURSE PRACTITIONER

## 2024-03-29 RX ORDER — BUSPIRONE HYDROCHLORIDE 10 MG/1
10 TABLET ORAL 3 TIMES DAILY PRN
Qty: 90 TABLET | Refills: 1 | Status: SHIPPED | OUTPATIENT
Start: 2024-03-29

## 2024-03-29 RX ORDER — LISINOPRIL 10 MG/1
10 TABLET ORAL DAILY
Qty: 90 TABLET | Refills: 1 | Status: SHIPPED | OUTPATIENT
Start: 2024-03-29

## 2024-03-29 NOTE — PROGRESS NOTES
"Thierry French presents to [facility] with complaint of  Hypertension (Follow up )    SUBJECTIVE  History of Present Illness    Patient is here for regular follow-up of hypertension.  He is currently on lisinopril 10 mg daily.  He is compliant with his medication and tolerating well.  Blood pressure is well-controlled.     Patient is also wanting to discuss anxiety.  He says that anxiety symptoms do not happen every day but generally around 3-4 times per week.  Patient says that he has an excessive worry over things that are sometimes out of his control.  For example, he worries about his daughter being stolen whenever he is at [employer].  Patient says that he feels like he overreacts ending on how his children are behaving, but feels that this is just part of being a parent.  Mentions he becomes overwhelmed whenever they are going on a big trip to be worried about packing the car up and getting the kids ready.  He denies any thoughts of depression, self-harm or suicide.  Patient says that he feels embarrassed talking about this, and the only reason he would bring it up as he says his wife is concerned about him.  Never been on any mental health medication in the past.      OBJECTIVE  Vital Signs:   /85 (BP Location: Right arm, Patient Position: Sitting)   Pulse 64   Ht 175.3 cm (69.02\")   Wt 110 kg (243 lb 9.6 oz)   BMI 35.95 kg/m²       Physical Exam  Constitutional:       General: He is not in acute distress.     Appearance: Normal appearance. He is not ill-appearing.   HENT:      Head: Normocephalic and atraumatic.      Nose: Nose normal.      Mouth/Throat:      Pharynx: Oropharynx is clear.   Cardiovascular:      Rate and Rhythm: Normal rate and regular rhythm.      Pulses: Normal pulses.      Heart sounds: Normal heart sounds.   Pulmonary:      Effort: Pulmonary effort is normal. No respiratory distress.      Breath sounds: Normal breath sounds.   Chest:      " Chest wall: No tenderness.   Musculoskeletal:         General: Normal range of motion.      Cervical back: Normal range of motion and neck supple.   Skin:     General: Skin is warm and dry.   Neurological:      General: No focal deficit present.      Mental Status: He is alert and oriented to person, place, and time. Mental status is at baseline.   Psychiatric:         Mood and Affect: Mood normal.         Behavior: Behavior normal.            ASSESSMENT AND PLAN:  Diagnoses and all orders for this visit:    1. Primary hypertension (Primary)  -     lisinopril (PRINIVIL,ZESTRIL) 10 MG tablet; Take 1 tablet by mouth Daily.  Dispense: 90 tablet; Refill: 1    2. Situational anxiety  -     busPIRone (BUSPAR) 10 MG tablet; Take 1 tablet by mouth 3 (Three) Times a Day As Needed (anxiety).  Dispense: 90 tablet; Refill: 1      Patient's blood pressure is well-controlled.  He will continue lisinopril 10 mg daily.  Refills provided.  He is up-to-date on labs.  For his anxiety, he was given BuSpar 10 mg to take as needed up to 3 times per day.  Educated on medication side effects.  We will follow-up in 3 to 4 weeks for reassessment.      Follow Up   Return in about 1 month (around 4/29/2024). Patient to notify office with any acute concerns or issues.  Patient verbalizes understanding, agrees with plan of care and has no further questions upon discharge.     Patient was given instructions and counseling regarding his condition or for health maintenance advice. Please see specific information pulled into the AVS if appropriate.     Discussed the importance of following up with any needed screening tests/labs/specialist appointments and any requested follow-up recommended by me today. Importance of maintaining follow-up discussed and patient accepts that missed appointments can delay diagnosis and potentially lead to worsening of conditions.    Part of this note may be an electronic transcription/translation of spoken language to  printed text using the Dragon Dictation System.

## 2024-06-29 DIAGNOSIS — I10 PRIMARY HYPERTENSION: ICD-10-CM

## 2024-07-01 RX ORDER — LISINOPRIL 10 MG/1
10 TABLET ORAL DAILY
Qty: 30 TABLET | OUTPATIENT
Start: 2024-07-01

## 2024-07-25 ENCOUNTER — OFFICE VISIT (OUTPATIENT)
Dept: FAMILY MEDICINE CLINIC | Age: 34
End: 2024-07-25
Payer: COMMERCIAL

## 2024-07-25 ENCOUNTER — LAB (OUTPATIENT)
Dept: LAB | Facility: HOSPITAL | Age: 34
End: 2024-07-25
Payer: COMMERCIAL

## 2024-07-25 VITALS
BODY MASS INDEX: 34.9 KG/M2 | TEMPERATURE: 97.8 F | WEIGHT: 235.6 LBS | DIASTOLIC BLOOD PRESSURE: 74 MMHG | SYSTOLIC BLOOD PRESSURE: 117 MMHG | OXYGEN SATURATION: 98 % | HEART RATE: 64 BPM | HEIGHT: 69 IN

## 2024-07-25 DIAGNOSIS — E53.8 VITAMIN B12 DEFICIENCY: ICD-10-CM

## 2024-07-25 DIAGNOSIS — Z13.29 SCREENING FOR THYROID DISORDER: ICD-10-CM

## 2024-07-25 DIAGNOSIS — K21.9 GASTROESOPHAGEAL REFLUX DISEASE, UNSPECIFIED WHETHER ESOPHAGITIS PRESENT: Primary | ICD-10-CM

## 2024-07-25 DIAGNOSIS — F41.8 SITUATIONAL ANXIETY: ICD-10-CM

## 2024-07-25 DIAGNOSIS — Z11.59 ENCOUNTER FOR HEPATITIS C SCREENING TEST FOR LOW RISK PATIENT: ICD-10-CM

## 2024-07-25 DIAGNOSIS — Z00.00 ROUTINE ADULT HEALTH MAINTENANCE: ICD-10-CM

## 2024-07-25 DIAGNOSIS — I10 PRIMARY HYPERTENSION: ICD-10-CM

## 2024-07-25 DIAGNOSIS — Z13.220 SCREENING FOR LIPID DISORDERS: ICD-10-CM

## 2024-07-25 DIAGNOSIS — E66.9 OBESITY (BMI 30-39.9): ICD-10-CM

## 2024-07-25 DIAGNOSIS — E55.9 VITAMIN D DEFICIENCY: ICD-10-CM

## 2024-07-25 DIAGNOSIS — M79.652 LEFT THIGH PAIN: ICD-10-CM

## 2024-07-25 DIAGNOSIS — H69.92 DYSFUNCTION OF LEFT EUSTACHIAN TUBE: ICD-10-CM

## 2024-07-25 LAB
25(OH)D3 SERPL-MCNC: 29 NG/ML (ref 30–100)
ALBUMIN SERPL-MCNC: 5 G/DL (ref 3.5–5.2)
ALBUMIN/GLOB SERPL: 1.6 G/DL
ALP SERPL-CCNC: 64 U/L (ref 39–117)
ALT SERPL W P-5'-P-CCNC: 58 U/L (ref 1–41)
ANION GAP SERPL CALCULATED.3IONS-SCNC: 9.4 MMOL/L (ref 5–15)
AST SERPL-CCNC: 34 U/L (ref 1–40)
BASOPHILS # BLD AUTO: 0.04 10*3/MM3 (ref 0–0.2)
BASOPHILS NFR BLD AUTO: 0.6 % (ref 0–1.5)
BILIRUB SERPL-MCNC: 0.6 MG/DL (ref 0–1.2)
BUN SERPL-MCNC: 17 MG/DL (ref 6–20)
BUN/CREAT SERPL: 14.4 (ref 7–25)
CALCIUM SPEC-SCNC: 10.2 MG/DL (ref 8.6–10.5)
CHLORIDE SERPL-SCNC: 102 MMOL/L (ref 98–107)
CHOLEST SERPL-MCNC: 156 MG/DL (ref 0–200)
CO2 SERPL-SCNC: 26.6 MMOL/L (ref 22–29)
CREAT SERPL-MCNC: 1.18 MG/DL (ref 0.76–1.27)
DEPRECATED RDW RBC AUTO: 40.8 FL (ref 37–54)
EGFRCR SERPLBLD CKD-EPI 2021: 84.1 ML/MIN/1.73
EOSINOPHIL # BLD AUTO: 0.05 10*3/MM3 (ref 0–0.4)
EOSINOPHIL NFR BLD AUTO: 0.8 % (ref 0.3–6.2)
ERYTHROCYTE [DISTWIDTH] IN BLOOD BY AUTOMATED COUNT: 12.4 % (ref 12.3–15.4)
FOLATE SERPL-MCNC: 15.4 NG/ML (ref 4.78–24.2)
GLOBULIN UR ELPH-MCNC: 3.2 GM/DL
GLUCOSE SERPL-MCNC: 95 MG/DL (ref 65–99)
HCT VFR BLD AUTO: 48.7 % (ref 37.5–51)
HCV AB SER QL: NORMAL
HDLC SERPL-MCNC: 31 MG/DL (ref 40–60)
HGB BLD-MCNC: 16.3 G/DL (ref 13–17.7)
IMM GRANULOCYTES # BLD AUTO: 0 10*3/MM3 (ref 0–0.05)
IMM GRANULOCYTES NFR BLD AUTO: 0 % (ref 0–0.5)
LDLC SERPL CALC-MCNC: 103 MG/DL (ref 0–100)
LDLC/HDLC SERPL: 3.27 {RATIO}
LYMPHOCYTES # BLD AUTO: 2.24 10*3/MM3 (ref 0.7–3.1)
LYMPHOCYTES NFR BLD AUTO: 34.6 % (ref 19.6–45.3)
MCH RBC QN AUTO: 29.6 PG (ref 26.6–33)
MCHC RBC AUTO-ENTMCNC: 33.5 G/DL (ref 31.5–35.7)
MCV RBC AUTO: 88.4 FL (ref 79–97)
MONOCYTES # BLD AUTO: 0.6 10*3/MM3 (ref 0.1–0.9)
MONOCYTES NFR BLD AUTO: 9.3 % (ref 5–12)
NEUTROPHILS NFR BLD AUTO: 3.54 10*3/MM3 (ref 1.7–7)
NEUTROPHILS NFR BLD AUTO: 54.7 % (ref 42.7–76)
PLATELET # BLD AUTO: 216 10*3/MM3 (ref 140–450)
PMV BLD AUTO: 11.4 FL (ref 6–12)
POTASSIUM SERPL-SCNC: 4.8 MMOL/L (ref 3.5–5.2)
PROT SERPL-MCNC: 8.2 G/DL (ref 6–8.5)
RBC # BLD AUTO: 5.51 10*6/MM3 (ref 4.14–5.8)
SODIUM SERPL-SCNC: 138 MMOL/L (ref 136–145)
TRIGL SERPL-MCNC: 118 MG/DL (ref 0–150)
TSH SERPL DL<=0.05 MIU/L-ACNC: 0.71 UIU/ML (ref 0.27–4.2)
VIT B12 BLD-MCNC: 531 PG/ML (ref 211–946)
VLDLC SERPL-MCNC: 22 MG/DL (ref 5–40)
WBC NRBC COR # BLD AUTO: 6.47 10*3/MM3 (ref 3.4–10.8)

## 2024-07-25 PROCEDURE — 36415 COLL VENOUS BLD VENIPUNCTURE: CPT

## 2024-07-25 PROCEDURE — 80050 GENERAL HEALTH PANEL: CPT

## 2024-07-25 PROCEDURE — 99214 OFFICE O/P EST MOD 30 MIN: CPT | Performed by: NURSE PRACTITIONER

## 2024-07-25 PROCEDURE — 82306 VITAMIN D 25 HYDROXY: CPT

## 2024-07-25 PROCEDURE — 86803 HEPATITIS C AB TEST: CPT

## 2024-07-25 PROCEDURE — 82607 VITAMIN B-12: CPT

## 2024-07-25 PROCEDURE — 82746 ASSAY OF FOLIC ACID SERUM: CPT

## 2024-07-25 PROCEDURE — 80061 LIPID PANEL: CPT

## 2024-07-25 RX ORDER — BUSPIRONE HYDROCHLORIDE 10 MG/1
10 TABLET ORAL 3 TIMES DAILY PRN
Qty: 90 TABLET | Refills: 1 | Status: SHIPPED | OUTPATIENT
Start: 2024-07-25

## 2024-07-25 RX ORDER — OMEPRAZOLE 20 MG/1
20 CAPSULE, DELAYED RELEASE ORAL DAILY
Qty: 90 CAPSULE | Refills: 1 | Status: SHIPPED | OUTPATIENT
Start: 2024-07-25

## 2024-07-25 NOTE — ASSESSMENT & PLAN NOTE
Ear exam normal, rec trying daily antihistamine and flonase, if not improving with these meds after 4-6 weeks, refer to ENT

## 2024-07-25 NOTE — PROGRESS NOTES
Thierry French presents to [facility] with complaint of  Earache ((L) ), Extremity Weakness ((L) leg ), and Heartburn    SUBJECTIVE  Earache   There is pain in the left ear. This is a new problem. Episode onset: 2 months ago. The problem occurs daily. The problem has been unchanged. There has been no fever. The pain is at a severity of 4/10. The pain is mild. Pertinent negatives include no abdominal pain, coughing, drainage, headaches, hearing loss, neck pain, rash, rhinorrhea or sore throat. He has tried nothing for the symptoms.   Extremity Weakness   Pain location: left upper/inner thigh. This is a new problem. The problem has been unchanged. Quality: soreness. The pain is mild. Pertinent negatives include no inability to bear weight, joint locking, joint swelling, limited range of motion, numbness, stiffness, tingling, upper extremity swelling, lower extremity swelling or redness. Exacerbated by: palpation. He has tried rest and NSAIDS for the symptoms. The treatment provided no relief.   Heartburn  He complains of belching, globus sensation, heartburn and water brash. He reports no abdominal pain, no coughing, no hoarse voice, no sore throat, no tooth decay or no wheezing. This is a chronic problem. Episode onset: 10 years ago, gradually has became worse over past week. The problem occurs frequently. The problem has been unchanged. The heartburn is located in the substernum and left chest. The heartburn is of moderate intensity. The heartburn does not limit his activity. The heartburn doesn't change with position. The symptoms are aggravated by caffeine. Associated symptoms include weight loss (has lost 8 lbs intentionally). Pertinent negatives include no anemia, fatigue, melena, muscle weakness or orthopnea. Risk factors include obesity and caffeine use. Treatments tried: pepto bismol capsules. The treatment provided no relief.     Patient is also having increase in anxiety,  "has had anxiety his whole life he says. He says he feels like a hypochondriac as he worries about his health. Is Rxd buspar but has left in heat/car, wanting new RX. Only took once in past so he is not sure if overly effective.     OBJECTIVE  Vital Signs:   /74 (BP Location: Right arm, Patient Position: Sitting)   Pulse 64   Temp 97.8 °F (36.6 °C) (Oral)   Ht 175.3 cm (69.02\")   Wt 107 kg (235 lb 9.6 oz)   SpO2 98% Comment: room air  BMI 34.77 kg/m²       Physical Exam  Constitutional:       General: He is not in acute distress.     Appearance: Normal appearance. He is obese. He is not ill-appearing.   HENT:      Head: Normocephalic and atraumatic.      Right Ear: Tympanic membrane and ear canal normal.      Left Ear: Tympanic membrane and ear canal normal.      Nose: Nose normal.      Mouth/Throat:      Pharynx: Oropharynx is clear.   Cardiovascular:      Rate and Rhythm: Normal rate and regular rhythm.      Pulses: Normal pulses.      Heart sounds: Normal heart sounds.   Pulmonary:      Effort: Pulmonary effort is normal. No respiratory distress.      Breath sounds: Normal breath sounds.   Chest:      Chest wall: No tenderness.   Musculoskeletal:         General: Normal range of motion.      Cervical back: Normal range of motion and neck supple.   Skin:     General: Skin is warm and dry.   Neurological:      General: No focal deficit present.      Mental Status: He is alert and oriented to person, place, and time. Mental status is at baseline.   Psychiatric:         Mood and Affect: Mood normal. Mood is anxious (mild).         Behavior: Behavior normal.              ASSESSMENT AND PLAN:  Diagnoses and all orders for this visit:    1. Gastroesophageal reflux disease, unspecified whether esophagitis present (Primary)  Assessment & Plan:  Discussed need to avoid trigger foods, he does drink coffee regularly, start omeprazole     Orders:  -     omeprazole (priLOSEC) 20 MG capsule; Take 1 capsule by mouth " Daily.  Dispense: 90 capsule; Refill: 1    2. Screening for thyroid disorder  -     TSH Rfx On Abnormal To Free T4; Future    3. Situational anxiety  Assessment & Plan:  Refilled buspar for him to try    Orders:  -     busPIRone (BUSPAR) 10 MG tablet; Take 1 tablet by mouth 3 (Three) Times a Day As Needed (anxiety).  Dispense: 90 tablet; Refill: 1    4. Vitamin D deficiency  -     Vitamin D,25-Hydroxy; Future    5. Vitamin B12 deficiency  -     Vitamin B12 & Folate; Future    6. Screening for lipid disorders  -     Lipid Panel; Future    7. Routine adult health maintenance  -     CBC & Differential; Future  -     Comprehensive Metabolic Panel; Future    8. Left thigh pain  Assessment & Plan:  Suspect musculoskeletal, he will try PT    Orders:  -     Ambulatory Referral to Physical Therapy    9. Dysfunction of left eustachian tube  Assessment & Plan:  Ear exam normal, rec trying daily antihistamine and flonase, if not improving with these meds after 4-6 weeks, refer to ENT      10. Encounter for hepatitis C screening test for low risk patient  -     Hepatitis C antibody; Future    11. Primary hypertension  Assessment & Plan:  Controlled with lisinopril 10 mg qday      12. Obesity (BMI 30-39.9)            Follow Up   Return if symptoms worsen or fail to improve. Patient to notify office with any acute concerns or issues.  Patient verbalizes understanding, agrees with plan of care and has no further questions upon discharge.     Patient was given instructions and counseling regarding his condition or for health maintenance advice. Please see specific information pulled into the AVS if appropriate.     Discussed the importance of following up with any needed screening tests/labs/specialist appointments and any requested follow-up recommended by me today. Importance of maintaining follow-up discussed and patient accepts that missed appointments can delay diagnosis and potentially lead to worsening of conditions.    Part  of this note may be an electronic transcription/translation of spoken language to printed text using the Dragon Dictation System.

## 2024-08-07 ENCOUNTER — PATIENT MESSAGE (OUTPATIENT)
Dept: FAMILY MEDICINE CLINIC | Age: 34
End: 2024-08-07
Payer: COMMERCIAL

## 2024-08-07 DIAGNOSIS — K21.9 GASTROESOPHAGEAL REFLUX DISEASE, UNSPECIFIED WHETHER ESOPHAGITIS PRESENT: Primary | ICD-10-CM

## 2024-09-23 ENCOUNTER — TELEPHONE (OUTPATIENT)
Dept: FAMILY MEDICINE CLINIC | Age: 34
End: 2024-09-23
Payer: COMMERCIAL

## 2024-12-09 ENCOUNTER — TELEPHONE (OUTPATIENT)
Dept: FAMILY MEDICINE CLINIC | Age: 34
End: 2024-12-09
Payer: COMMERCIAL

## 2024-12-09 NOTE — TELEPHONE ENCOUNTER
Pt states he is having intrusive thoughts, his wife called him to say at the baby's 18 mos check up the dr noted him to have an umbilical hernia, his anxiety went to worse case scenario and heis thoughts start to go to his son being cut open and his thoughts just have him weak at the knees. Pt stated he had been feelng really good after being on buspar however he stopped taking it, he feels these feelings are worse now than before. He restarted the buspar yesterday and an appt was made for 12/10/24. He feels the buspar is not lasting long enough, pt inf to continue rx as prescribed and to discuss at ov tomorrow. He denies any harmful thoughts to himself or others.

## 2024-12-09 NOTE — TELEPHONE ENCOUNTER
Caller: Thierry French    Relationship to patient: Self    Best call back number: [phone]     Patient is needing:     HUB TRANSFERRED CALL TO OFFICE.      PATIENT STATES HE WANTED TO SPEAK TO SOMEONE REGARDING HIS ANXIETY AND DEPRESSION. DENIED SUICIDAL IDEATION.

## 2024-12-10 ENCOUNTER — OFFICE VISIT (OUTPATIENT)
Dept: FAMILY MEDICINE CLINIC | Age: 34
End: 2024-12-10
Payer: COMMERCIAL

## 2024-12-10 VITALS
HEART RATE: 75 BPM | HEIGHT: 69 IN | SYSTOLIC BLOOD PRESSURE: 129 MMHG | DIASTOLIC BLOOD PRESSURE: 62 MMHG | WEIGHT: 232 LBS | OXYGEN SATURATION: 96 % | BODY MASS INDEX: 34.36 KG/M2 | TEMPERATURE: 98.3 F

## 2024-12-10 DIAGNOSIS — F32.1 CURRENT MODERATE EPISODE OF MAJOR DEPRESSIVE DISORDER WITHOUT PRIOR EPISODE: Primary | ICD-10-CM

## 2024-12-10 DIAGNOSIS — F41.1 GAD (GENERALIZED ANXIETY DISORDER): ICD-10-CM

## 2024-12-10 PROCEDURE — 99214 OFFICE O/P EST MOD 30 MIN: CPT | Performed by: NURSE PRACTITIONER

## 2024-12-10 RX ORDER — ESCITALOPRAM OXALATE 10 MG/1
10 TABLET ORAL DAILY
Qty: 90 TABLET | Refills: 0 | Status: SHIPPED | OUTPATIENT
Start: 2024-12-10

## 2024-12-10 RX ORDER — BUSPIRONE HYDROCHLORIDE 10 MG/1
10 TABLET ORAL 3 TIMES DAILY PRN
Qty: 90 TABLET | Refills: 1 | Status: SHIPPED | OUTPATIENT
Start: 2024-12-10

## 2024-12-10 RX ORDER — AMOXICILLIN 500 MG/1
500 CAPSULE ORAL 2 TIMES DAILY
COMMUNITY

## 2024-12-10 NOTE — PROGRESS NOTES
"Thierry French presents to [facility] with complaint of  Anxiety (Worsening over the past week ) and Depression    SUBJECTIVE  History of Present Illness    Patient is here to discuss issues with anxiety and depression.  He has known anxiety.  He was started on BuSpar 10 mg 3 times per day as needed earlier this year.  Patient says that the medication was working very well to control his symptoms.  He did have to use it on a schedule 3 times per day regularly.  He noticed over the past little bit however that seen at the BuSpar was wearing off faster than it was previously.  He had to take a fourth dose of this medication yesterday.  He does not report any new stressors or triggers that could have worsened his anxiety and depression.  He denies any suicidal thoughts.    Patient was having heartburn earlier this year, was on omeprazole.  This has not resolved he says.    OBJECTIVE  Vital Signs:   /62 (BP Location: Right arm, Patient Position: Sitting, Cuff Size: Adult)   Pulse 75   Temp 98.3 °F (36.8 °C) (Oral)   Ht 175.3 cm (69.02\")   Wt 105 kg (232 lb)   SpO2 96%   BMI 34.24 kg/m²       Physical Exam  Constitutional:       General: He is not in acute distress.     Appearance: Normal appearance. He is not ill-appearing.   HENT:      Head: Normocephalic and atraumatic.      Nose: Nose normal.      Mouth/Throat:      Pharynx: Oropharynx is clear.   Cardiovascular:      Rate and Rhythm: Normal rate and regular rhythm.      Pulses: Normal pulses.      Heart sounds: Normal heart sounds.   Pulmonary:      Effort: Pulmonary effort is normal. No respiratory distress.      Breath sounds: Normal breath sounds.   Chest:      Chest wall: No tenderness.   Musculoskeletal:         General: Normal range of motion.      Cervical back: Normal range of motion and neck supple.   Skin:     General: Skin is warm and dry.   Neurological:      General: No focal deficit present.      Mental " Status: He is alert and oriented to person, place, and time. Mental status is at baseline.   Psychiatric:         Mood and Affect: Mood is anxious and depressed.         Behavior: Behavior normal.         Thought Content: Thought content does not include suicidal ideation.              ASSESSMENT AND PLAN:  Diagnoses and all orders for this visit:    1. Current moderate episode of major depressive disorder without prior episode (Primary)  -     escitalopram (Lexapro) 10 MG tablet; Take 1 tablet by mouth Daily.  Dispense: 90 tablet; Refill: 0    2. FLORENCE (generalized anxiety disorder)  -     escitalopram (Lexapro) 10 MG tablet; Take 1 tablet by mouth Daily.  Dispense: 90 tablet; Refill: 0  -     busPIRone (BUSPAR) 10 MG tablet; Take 1 tablet by mouth 3 (Three) Times a Day As Needed (anxiety).  Dispense: 90 tablet; Refill: 1        Patient will start Lexapro 10 mg daily.  Educated on medication side effects.  Also discussed importance of eating a healthy diet and regular exercise.  If his symptoms seem to be worse or if suicidal thoughts occur after starting Lexapro, he will notify office.  Patient understands that it will take several weeks to see a benefit from the Lexapro.  We will follow-up in 6 weeks for reassessment.  He may continue taking BuSpar in the meantime.    Follow Up   Return in about 6 weeks (around 1/21/2025). Patient to notify office with any acute concerns or issues.  Patient verbalizes understanding, agrees with plan of care and has no further questions upon discharge.     Patient was given instructions and counseling regarding his condition or for health maintenance advice. Please see specific information pulled into the AVS if appropriate.     Discussed the importance of following up with any needed screening tests/labs/specialist appointments and any requested follow-up recommended by me today. Importance of maintaining follow-up discussed and patient accepts that missed appointments can delay  diagnosis and potentially lead to worsening of conditions.    Part of this note may be an electronic transcription/translation of spoken language to printed text using the Dragon Dictation System.

## 2024-12-30 DIAGNOSIS — I10 PRIMARY HYPERTENSION: ICD-10-CM

## 2024-12-30 RX ORDER — LISINOPRIL 10 MG/1
10 TABLET ORAL DAILY
Qty: 90 TABLET | Refills: 1 | Status: SHIPPED | OUTPATIENT
Start: 2024-12-30

## 2025-01-21 ENCOUNTER — OFFICE VISIT (OUTPATIENT)
Dept: FAMILY MEDICINE CLINIC | Age: 35
End: 2025-01-21
Payer: COMMERCIAL

## 2025-01-21 VITALS
HEIGHT: 69 IN | SYSTOLIC BLOOD PRESSURE: 122 MMHG | OXYGEN SATURATION: 98 % | BODY MASS INDEX: 35.4 KG/M2 | HEART RATE: 60 BPM | TEMPERATURE: 97.7 F | WEIGHT: 239 LBS | DIASTOLIC BLOOD PRESSURE: 80 MMHG

## 2025-01-21 DIAGNOSIS — I10 PRIMARY HYPERTENSION: ICD-10-CM

## 2025-01-21 DIAGNOSIS — F41.1 GAD (GENERALIZED ANXIETY DISORDER): ICD-10-CM

## 2025-01-21 DIAGNOSIS — F32.1 CURRENT MODERATE EPISODE OF MAJOR DEPRESSIVE DISORDER WITHOUT PRIOR EPISODE: Primary | ICD-10-CM

## 2025-01-21 PROCEDURE — 99214 OFFICE O/P EST MOD 30 MIN: CPT | Performed by: NURSE PRACTITIONER

## 2025-01-21 RX ORDER — ESCITALOPRAM OXALATE 10 MG/1
10 TABLET ORAL DAILY
Qty: 90 TABLET | Refills: 1 | Status: SHIPPED | OUTPATIENT
Start: 2025-01-21

## 2025-01-21 RX ORDER — BUSPIRONE HYDROCHLORIDE 10 MG/1
10 TABLET ORAL 3 TIMES DAILY PRN
Qty: 90 TABLET | Refills: 1 | Status: SHIPPED | OUTPATIENT
Start: 2025-01-21

## 2025-01-21 NOTE — PROGRESS NOTES
"Thierry French presents to [facility] with complaint of  Depression (6 wk. F/U )    SUBJECTIVE  History of Present Illness    Patient is here for 6-week follow-up of anxiety and depression.  At his last visit, he was started on Lexapro 10 mg daily.  He is also on BuSpar 10 mg 3 times per day for anxiety as needed.  He does still require using the BuSpar pretty regularly.  Patient says that he feels better overall, and is having less depressive thoughts.  Denies any issues with the medication other than having occasional night sweats.  He also mentions that his wife notices an improvement in his mood as well.      OBJECTIVE  Vital Signs:   /80   Pulse 60   Temp 97.7 °F (36.5 °C) (Oral)   Ht 175.3 cm (69.02\")   Wt 108 kg (239 lb)   SpO2 98%   BMI 35.27 kg/m²       Physical Exam  Constitutional:       General: He is not in acute distress.     Appearance: Normal appearance. He is not ill-appearing.   HENT:      Head: Normocephalic and atraumatic.      Nose: Nose normal.      Mouth/Throat:      Pharynx: Oropharynx is clear.   Cardiovascular:      Rate and Rhythm: Normal rate and regular rhythm.      Pulses: Normal pulses.      Heart sounds: Normal heart sounds.   Pulmonary:      Effort: Pulmonary effort is normal. No respiratory distress.      Breath sounds: Normal breath sounds.   Chest:      Chest wall: No tenderness.   Musculoskeletal:         General: Normal range of motion.      Cervical back: Normal range of motion and neck supple.   Skin:     General: Skin is warm and dry.   Neurological:      General: No focal deficit present.      Mental Status: He is alert and oriented to person, place, and time. Mental status is at baseline.   Psychiatric:         Mood and Affect: Mood normal.         Behavior: Behavior normal.              ASSESSMENT AND PLAN:  Diagnoses and all orders for this visit:    1. Current moderate episode of major depressive disorder without prior " episode (Primary)  -     escitalopram (Lexapro) 10 MG tablet; Take 1 tablet by mouth Daily.  Dispense: 90 tablet; Refill: 1    2. FLORENCE (generalized anxiety disorder)  -     busPIRone (BUSPAR) 10 MG tablet; Take 1 tablet by mouth 3 (Three) Times a Day As Needed (anxiety).  Dispense: 90 tablet; Refill: 1  -     escitalopram (Lexapro) 10 MG tablet; Take 1 tablet by mouth Daily.  Dispense: 90 tablet; Refill: 1    3. Primary hypertension      Patient has had significant improvement in depression and anxiety.  He will continue Lexapro 10 mg daily.  Blood pressure is controlled, continue lisinopril 10 mg daily.  Will follow-up in July for annual exam/labs and reassessment of hypertension and anxiety/depression.  If his anxiety and depression symptoms do not seem well-controlled within this 6-month period, he should follow-up sooner.  He also plans on meeting with a therapist which will also be beneficial.      Follow Up   Return in about 6 months (around 7/21/2025) for Annual physical. Patient to notify office with any acute concerns or issues.  Patient verbalizes understanding, agrees with plan of care and has no further questions upon discharge.     Patient was given instructions and counseling regarding his condition or for health maintenance advice. Please see specific information pulled into the AVS if appropriate.     Discussed the importance of following up with any needed screening tests/labs/specialist appointments and any requested follow-up recommended by me today. Importance of maintaining follow-up discussed and patient accepts that missed appointments can delay diagnosis and potentially lead to worsening of conditions.    Part of this note may be an electronic transcription/translation of spoken language to printed text using the Dragon Dictation System.

## 2025-02-18 DIAGNOSIS — F41.1 GAD (GENERALIZED ANXIETY DISORDER): ICD-10-CM

## 2025-02-18 RX ORDER — BUSPIRONE HYDROCHLORIDE 10 MG/1
10 TABLET ORAL 3 TIMES DAILY PRN
Qty: 180 TABLET | Refills: 1 | Status: SHIPPED | OUTPATIENT
Start: 2025-02-18

## 2025-02-18 NOTE — TELEPHONE ENCOUNTER
Rx Refill Note  Requested Prescriptions     Pending Prescriptions Disp Refills    busPIRone (BUSPAR) 10 MG tablet [Pharmacy Med Name: BUSPIRONE 10MG TABLETS] 90 tablet 1     Sig: TAKE 1 TABLET BY MOUTH THREE TIMES DAILY AS NEEDED FOR ANXIETY      Last office visit with prescribing clinician: 1/21/2025     Next office visit with prescribing clinician: 7/22/25 M Mouser    busPIRone (BUSPAR) 10 MG tablet (01/21/2025)     Comprehensive Metabolic Panel (07/25/2024 11:09)     Tianna Pereyra LPN  02/18/25, 10:03 EST

## 2025-04-19 DIAGNOSIS — K21.9 GASTROESOPHAGEAL REFLUX DISEASE, UNSPECIFIED WHETHER ESOPHAGITIS PRESENT: ICD-10-CM

## 2025-04-21 RX ORDER — OMEPRAZOLE 20 MG/1
20 CAPSULE, DELAYED RELEASE ORAL DAILY
Qty: 90 CAPSULE | Refills: 1 | OUTPATIENT
Start: 2025-04-21

## 2025-07-24 DIAGNOSIS — I10 PRIMARY HYPERTENSION: ICD-10-CM

## 2025-07-24 RX ORDER — LISINOPRIL 10 MG/1
10 TABLET ORAL DAILY
Qty: 90 TABLET | Refills: 0 | Status: SHIPPED | OUTPATIENT
Start: 2025-07-24

## 2025-07-24 NOTE — TELEPHONE ENCOUNTER
Rx Refill Note  Requested Prescriptions     Pending Prescriptions Disp Refills    lisinopril (PRINIVIL,ZESTRIL) 10 MG tablet [Pharmacy Med Name: LISINOPRIL 10MG TABLETS] 90 tablet 1     Sig: TAKE 1 TABLET BY MOUTH DAILY      Last office visit with prescribing clinician: 1/21/2025   Last telemedicine visit with prescribing clinician: Visit date not found   Next office visit with prescribing clinician: 8/29/25      Dania Martin LPN  07/24/25, 08:46 EDT    LF-12/30/24 #90, RF-1    Comprehensive Metabolic Panel (07/25/2024 11:09)     PT HAS NEW PT APPT 8/29/25    ON CALL FOR ALEJANDRA

## 2025-08-26 DIAGNOSIS — I10 PRIMARY HYPERTENSION: ICD-10-CM

## 2025-08-26 RX ORDER — LISINOPRIL 10 MG/1
10 TABLET ORAL DAILY
Qty: 90 TABLET | Refills: 0 | OUTPATIENT
Start: 2025-08-26

## 2025-08-27 DIAGNOSIS — I10 PRIMARY HYPERTENSION: ICD-10-CM

## 2025-08-27 RX ORDER — LISINOPRIL 10 MG/1
10 TABLET ORAL DAILY
Qty: 90 TABLET | Refills: 0 | Status: SHIPPED | OUTPATIENT
Start: 2025-08-27 | End: 2025-08-29 | Stop reason: SDUPTHER

## 2025-08-29 ENCOUNTER — OFFICE VISIT (OUTPATIENT)
Dept: FAMILY MEDICINE CLINIC | Age: 35
End: 2025-08-29
Payer: COMMERCIAL

## 2025-08-29 VITALS
BODY MASS INDEX: 35.99 KG/M2 | HEART RATE: 69 BPM | OXYGEN SATURATION: 97 % | DIASTOLIC BLOOD PRESSURE: 77 MMHG | WEIGHT: 243 LBS | SYSTOLIC BLOOD PRESSURE: 118 MMHG | HEIGHT: 69 IN | TEMPERATURE: 98.1 F

## 2025-08-29 DIAGNOSIS — R74.8 ELEVATED LIVER ENZYMES: ICD-10-CM

## 2025-08-29 DIAGNOSIS — I10 PRIMARY HYPERTENSION: ICD-10-CM

## 2025-08-29 DIAGNOSIS — E66.9 OBESITY (BMI 30-39.9): ICD-10-CM

## 2025-08-29 DIAGNOSIS — E78.2 MIXED HYPERLIPIDEMIA: ICD-10-CM

## 2025-08-29 DIAGNOSIS — Z00.00 ANNUAL PHYSICAL EXAM: Primary | ICD-10-CM

## 2025-08-29 PROBLEM — M25.512 CHRONIC LEFT SHOULDER PAIN: Status: RESOLVED | Noted: 2022-06-28 | Resolved: 2025-08-29

## 2025-08-29 PROBLEM — M79.652 LEFT THIGH PAIN: Status: RESOLVED | Noted: 2024-07-25 | Resolved: 2025-08-29

## 2025-08-29 PROBLEM — L73.9 FOLLICULITIS: Status: ACTIVE | Noted: 2025-08-29

## 2025-08-29 PROBLEM — H69.92 DYSFUNCTION OF LEFT EUSTACHIAN TUBE: Status: RESOLVED | Noted: 2024-07-25 | Resolved: 2025-08-29

## 2025-08-29 PROBLEM — E66.811 OBESITY, CLASS I, BMI 30-34.9: Status: RESOLVED | Noted: 2022-06-28 | Resolved: 2025-08-29

## 2025-08-29 PROBLEM — F41.8 SITUATIONAL ANXIETY: Status: RESOLVED | Noted: 2024-07-25 | Resolved: 2025-08-29

## 2025-08-29 PROBLEM — R00.2 PALPITATIONS: Status: RESOLVED | Noted: 2022-06-28 | Resolved: 2025-08-29

## 2025-08-29 PROBLEM — G89.29 CHRONIC LEFT SHOULDER PAIN: Status: RESOLVED | Noted: 2022-06-28 | Resolved: 2025-08-29

## 2025-08-29 RX ORDER — CHOLECALCIFEROL (VITAMIN D3) 25 MCG
1000 TABLET ORAL DAILY
COMMUNITY

## 2025-08-29 RX ORDER — LISINOPRIL 10 MG/1
10 TABLET ORAL DAILY
Qty: 90 TABLET | Refills: 3 | Status: SHIPPED | OUTPATIENT
Start: 2025-08-29